# Patient Record
Sex: FEMALE | Race: BLACK OR AFRICAN AMERICAN | NOT HISPANIC OR LATINO
[De-identification: names, ages, dates, MRNs, and addresses within clinical notes are randomized per-mention and may not be internally consistent; named-entity substitution may affect disease eponyms.]

---

## 2017-02-28 ENCOUNTER — APPOINTMENT (OUTPATIENT)
Dept: OTOLARYNGOLOGY | Facility: CLINIC | Age: 57
End: 2017-02-28

## 2017-11-20 ENCOUNTER — APPOINTMENT (OUTPATIENT)
Dept: OTOLARYNGOLOGY | Facility: CLINIC | Age: 57
End: 2017-11-20
Payer: COMMERCIAL

## 2017-11-20 VITALS
SYSTOLIC BLOOD PRESSURE: 125 MMHG | DIASTOLIC BLOOD PRESSURE: 70 MMHG | HEIGHT: 64 IN | TEMPERATURE: 62 F | BODY MASS INDEX: 24.75 KG/M2 | WEIGHT: 145 LBS

## 2017-11-20 DIAGNOSIS — J33.9 NASAL POLYP, UNSPECIFIED: ICD-10-CM

## 2017-11-20 PROCEDURE — 31231 NASAL ENDOSCOPY DX: CPT

## 2017-11-20 PROCEDURE — 99204 OFFICE O/P NEW MOD 45 MIN: CPT | Mod: 25

## 2017-11-29 ENCOUNTER — OUTPATIENT (OUTPATIENT)
Dept: OUTPATIENT SERVICES | Facility: HOSPITAL | Age: 57
LOS: 1 days | End: 2017-11-29
Payer: COMMERCIAL

## 2017-11-29 ENCOUNTER — RESULT REVIEW (OUTPATIENT)
Age: 57
End: 2017-11-29

## 2017-11-29 DIAGNOSIS — Z98.89 OTHER SPECIFIED POSTPROCEDURAL STATES: Chronic | ICD-10-CM

## 2017-11-29 PROCEDURE — 88173 CYTOPATH EVAL FNA REPORT: CPT

## 2017-11-29 PROCEDURE — 76942 ECHO GUIDE FOR BIOPSY: CPT | Mod: 26

## 2017-11-29 PROCEDURE — 76942 ECHO GUIDE FOR BIOPSY: CPT

## 2017-11-29 PROCEDURE — 10022: CPT

## 2017-11-29 PROCEDURE — 10022: CPT | Mod: 59

## 2017-12-01 LAB
NON-GYNECOLOGICAL CYTOLOGY STUDY: SIGNIFICANT CHANGE UP
NON-GYNECOLOGICAL CYTOLOGY STUDY: SIGNIFICANT CHANGE UP

## 2017-12-08 ENCOUNTER — OUTPATIENT (OUTPATIENT)
Dept: OUTPATIENT SERVICES | Facility: HOSPITAL | Age: 57
LOS: 1 days | End: 2017-12-08
Payer: COMMERCIAL

## 2017-12-08 ENCOUNTER — APPOINTMENT (OUTPATIENT)
Dept: OTOLARYNGOLOGY | Facility: CLINIC | Age: 57
End: 2017-12-08

## 2017-12-08 DIAGNOSIS — Z98.89 OTHER SPECIFIED POSTPROCEDURAL STATES: Chronic | ICD-10-CM

## 2017-12-08 PROCEDURE — 93005 ELECTROCARDIOGRAM TRACING: CPT

## 2017-12-08 PROCEDURE — 71046 X-RAY EXAM CHEST 2 VIEWS: CPT

## 2017-12-08 PROCEDURE — 93010 ELECTROCARDIOGRAM REPORT: CPT

## 2017-12-08 PROCEDURE — 71020: CPT | Mod: 26

## 2017-12-18 ENCOUNTER — APPOINTMENT (OUTPATIENT)
Dept: OTOLARYNGOLOGY | Facility: CLINIC | Age: 57
End: 2017-12-18

## 2017-12-18 ENCOUNTER — RESULT REVIEW (OUTPATIENT)
Age: 57
End: 2017-12-18

## 2017-12-18 ENCOUNTER — OUTPATIENT (OUTPATIENT)
Dept: OUTPATIENT SERVICES | Facility: HOSPITAL | Age: 57
LOS: 1 days | Discharge: HOME | End: 2017-12-18
Payer: COMMERCIAL

## 2017-12-18 DIAGNOSIS — Z98.89 OTHER SPECIFIED POSTPROCEDURAL STATES: Chronic | ICD-10-CM

## 2017-12-18 PROCEDURE — 38510 BIOPSY/REMOVAL LYMPH NODES: CPT | Mod: RT

## 2017-12-18 RX ORDER — CEFUROXIME AXETIL 500 MG/1
500 TABLET ORAL
Qty: 14 | Refills: 0 | Status: ACTIVE | COMMUNITY
Start: 2017-12-18 | End: 1900-01-01

## 2017-12-18 RX ORDER — OXYCODONE AND ACETAMINOPHEN 5; 325 MG/1; MG/1
5-325 TABLET ORAL
Qty: 25 | Refills: 0 | Status: ACTIVE | COMMUNITY
Start: 2017-12-18 | End: 1900-01-01

## 2017-12-20 LAB — SURGICAL PATHOLOGY STUDY: SIGNIFICANT CHANGE UP

## 2017-12-26 ENCOUNTER — APPOINTMENT (OUTPATIENT)
Dept: OTOLARYNGOLOGY | Facility: CLINIC | Age: 57
End: 2017-12-26
Payer: COMMERCIAL

## 2017-12-26 VITALS
SYSTOLIC BLOOD PRESSURE: 109 MMHG | WEIGHT: 145 LBS | HEIGHT: 64 IN | DIASTOLIC BLOOD PRESSURE: 78 MMHG | HEART RATE: 79 BPM | BODY MASS INDEX: 24.75 KG/M2

## 2017-12-26 PROCEDURE — 99024 POSTOP FOLLOW-UP VISIT: CPT

## 2017-12-26 RX ORDER — CETIRIZINE HYDROCHLORIDE 10 MG/1
10 CAPSULE, LIQUID FILLED ORAL
Refills: 0 | Status: ACTIVE | COMMUNITY

## 2018-05-19 ENCOUNTER — EMERGENCY (EMERGENCY)
Facility: HOSPITAL | Age: 58
LOS: 1 days | Discharge: HOME | End: 2018-05-19
Attending: EMERGENCY MEDICINE | Admitting: HOSPITALIST
Payer: COMMERCIAL

## 2018-05-19 VITALS
HEART RATE: 73 BPM | DIASTOLIC BLOOD PRESSURE: 66 MMHG | SYSTOLIC BLOOD PRESSURE: 108 MMHG | RESPIRATION RATE: 18 BRPM | OXYGEN SATURATION: 100 % | TEMPERATURE: 99 F

## 2018-05-19 DIAGNOSIS — Z98.89 OTHER SPECIFIED POSTPROCEDURAL STATES: Chronic | ICD-10-CM

## 2018-05-19 LAB
ALBUMIN SERPL ELPH-MCNC: 4.4 G/DL — SIGNIFICANT CHANGE UP (ref 3.3–5)
ALP SERPL-CCNC: 80 U/L — SIGNIFICANT CHANGE UP (ref 40–120)
ALT FLD-CCNC: 17 U/L — SIGNIFICANT CHANGE UP (ref 4–33)
APPEARANCE UR: CLEAR — SIGNIFICANT CHANGE UP
AST SERPL-CCNC: 20 U/L — SIGNIFICANT CHANGE UP (ref 4–32)
BASOPHILS # BLD AUTO: 0.01 K/UL — SIGNIFICANT CHANGE UP (ref 0–0.2)
BASOPHILS NFR BLD AUTO: 0.2 % — SIGNIFICANT CHANGE UP (ref 0–2)
BILIRUB SERPL-MCNC: 0.4 MG/DL — SIGNIFICANT CHANGE UP (ref 0.2–1.2)
BILIRUB UR-MCNC: NEGATIVE — SIGNIFICANT CHANGE UP
BLOOD UR QL VISUAL: HIGH
BUN SERPL-MCNC: 14 MG/DL — SIGNIFICANT CHANGE UP (ref 7–23)
CALCIUM SERPL-MCNC: 9.1 MG/DL — SIGNIFICANT CHANGE UP (ref 8.4–10.5)
CHLORIDE SERPL-SCNC: 101 MMOL/L — SIGNIFICANT CHANGE UP (ref 98–107)
CO2 SERPL-SCNC: 27 MMOL/L — SIGNIFICANT CHANGE UP (ref 22–31)
COLOR SPEC: YELLOW — SIGNIFICANT CHANGE UP
CREAT SERPL-MCNC: 0.69 MG/DL — SIGNIFICANT CHANGE UP (ref 0.5–1.3)
EOSINOPHIL # BLD AUTO: 0 K/UL — SIGNIFICANT CHANGE UP (ref 0–0.5)
EOSINOPHIL NFR BLD AUTO: 0 % — SIGNIFICANT CHANGE UP (ref 0–6)
GLUCOSE SERPL-MCNC: 129 MG/DL — HIGH (ref 70–99)
GLUCOSE UR-MCNC: NEGATIVE — SIGNIFICANT CHANGE UP
HCT VFR BLD CALC: 39.6 % — SIGNIFICANT CHANGE UP (ref 34.5–45)
HGB BLD-MCNC: 12.7 G/DL — SIGNIFICANT CHANGE UP (ref 11.5–15.5)
IMM GRANULOCYTES # BLD AUTO: 0.01 # — SIGNIFICANT CHANGE UP
IMM GRANULOCYTES NFR BLD AUTO: 0.2 % — SIGNIFICANT CHANGE UP (ref 0–1.5)
KETONES UR-MCNC: SIGNIFICANT CHANGE UP
LEUKOCYTE ESTERASE UR-ACNC: NEGATIVE — SIGNIFICANT CHANGE UP
LIDOCAIN IGE QN: 10.5 U/L — SIGNIFICANT CHANGE UP (ref 7–60)
LYMPHOCYTES # BLD AUTO: 0.27 K/UL — LOW (ref 1–3.3)
LYMPHOCYTES # BLD AUTO: 4.6 % — LOW (ref 13–44)
MCHC RBC-ENTMCNC: 28.5 PG — SIGNIFICANT CHANGE UP (ref 27–34)
MCHC RBC-ENTMCNC: 32.1 % — SIGNIFICANT CHANGE UP (ref 32–36)
MCV RBC AUTO: 89 FL — SIGNIFICANT CHANGE UP (ref 80–100)
MONOCYTES # BLD AUTO: 0.22 K/UL — SIGNIFICANT CHANGE UP (ref 0–0.9)
MONOCYTES NFR BLD AUTO: 3.7 % — SIGNIFICANT CHANGE UP (ref 2–14)
MUCOUS THREADS # UR AUTO: SIGNIFICANT CHANGE UP
NEUTROPHILS # BLD AUTO: 5.38 K/UL — SIGNIFICANT CHANGE UP (ref 1.8–7.4)
NEUTROPHILS NFR BLD AUTO: 91.3 % — HIGH (ref 43–77)
NITRITE UR-MCNC: NEGATIVE — SIGNIFICANT CHANGE UP
NON-SQ EPI CELLS # UR AUTO: <1 — SIGNIFICANT CHANGE UP
NRBC # FLD: 0 — SIGNIFICANT CHANGE UP
PH UR: 6 — SIGNIFICANT CHANGE UP (ref 4.6–8)
PLATELET # BLD AUTO: 197 K/UL — SIGNIFICANT CHANGE UP (ref 150–400)
PMV BLD: 11.9 FL — SIGNIFICANT CHANGE UP (ref 7–13)
POTASSIUM SERPL-MCNC: 3.9 MMOL/L — SIGNIFICANT CHANGE UP (ref 3.5–5.3)
POTASSIUM SERPL-SCNC: 3.9 MMOL/L — SIGNIFICANT CHANGE UP (ref 3.5–5.3)
PROT SERPL-MCNC: 7.7 G/DL — SIGNIFICANT CHANGE UP (ref 6–8.3)
PROT UR-MCNC: 10 MG/DL — SIGNIFICANT CHANGE UP
RBC # BLD: 4.45 M/UL — SIGNIFICANT CHANGE UP (ref 3.8–5.2)
RBC # FLD: 12.7 % — SIGNIFICANT CHANGE UP (ref 10.3–14.5)
RBC CASTS # UR COMP ASSIST: HIGH (ref 0–?)
SODIUM SERPL-SCNC: 139 MMOL/L — SIGNIFICANT CHANGE UP (ref 135–145)
SP GR SPEC: 1.03 — SIGNIFICANT CHANGE UP (ref 1–1.04)
UROBILINOGEN FLD QL: NORMAL MG/DL — SIGNIFICANT CHANGE UP
WBC # BLD: 5.89 K/UL — SIGNIFICANT CHANGE UP (ref 3.8–10.5)
WBC # FLD AUTO: 5.89 K/UL — SIGNIFICANT CHANGE UP (ref 3.8–10.5)
WBC UR QL: SIGNIFICANT CHANGE UP (ref 0–?)

## 2018-05-19 PROCEDURE — 99285 EMERGENCY DEPT VISIT HI MDM: CPT

## 2018-05-19 PROCEDURE — 74176 CT ABD & PELVIS W/O CONTRAST: CPT | Mod: 26

## 2018-05-19 RX ORDER — ONDANSETRON 8 MG/1
4 TABLET, FILM COATED ORAL ONCE
Qty: 0 | Refills: 0 | Status: COMPLETED | OUTPATIENT
Start: 2018-05-19 | End: 2018-05-19

## 2018-05-19 RX ORDER — SODIUM CHLORIDE 9 MG/ML
1000 INJECTION INTRAMUSCULAR; INTRAVENOUS; SUBCUTANEOUS ONCE
Qty: 0 | Refills: 0 | Status: COMPLETED | OUTPATIENT
Start: 2018-05-19 | End: 2018-05-19

## 2018-05-19 RX ORDER — ACETAMINOPHEN 500 MG
650 TABLET ORAL ONCE
Qty: 0 | Refills: 0 | Status: COMPLETED | OUTPATIENT
Start: 2018-05-19 | End: 2018-05-19

## 2018-05-19 RX ORDER — KETOROLAC TROMETHAMINE 30 MG/ML
15 SYRINGE (ML) INJECTION ONCE
Qty: 0 | Refills: 0 | Status: DISCONTINUED | OUTPATIENT
Start: 2018-05-19 | End: 2018-05-19

## 2018-05-19 RX ADMIN — ONDANSETRON 4 MILLIGRAM(S): 8 TABLET, FILM COATED ORAL at 20:50

## 2018-05-19 RX ADMIN — SODIUM CHLORIDE 2000 MILLILITER(S): 9 INJECTION INTRAMUSCULAR; INTRAVENOUS; SUBCUTANEOUS at 20:50

## 2018-05-19 RX ADMIN — Medication 15 MILLIGRAM(S): at 20:57

## 2018-05-19 RX ADMIN — Medication 650 MILLIGRAM(S): at 23:45

## 2018-05-19 RX ADMIN — Medication 15 MILLIGRAM(S): at 21:13

## 2018-05-19 NOTE — ED PROVIDER NOTE - OBJECTIVE STATEMENT
57F presenting with nausea, vomiting and diarrhea x 1d. Pt endorsed to vomiting x 5, diarrhea x 5, non bloody, non biliary. Pt endorsed to abdominal cramping, better with BM. Pt is on Diclofenac for arthritis. No fever or chills. Pt also has a back pain, started while she was vomiting. Now worse with movement. Pain is non radiating. 57F presenting with nausea, vomiting and diarrhea x 1d. Pt endorsed to vomiting x 5, diarrhea x 5, non bloody, non biliary. Pt endorsed to abdominal cramping, better with BM. Pt is on Diclofenac for arthritis. N/V/D started after she drank soy milk today. No fever or chills. Pt also has a back pain, started while she was vomiting. Now worse with movement. Pain is non radiating.

## 2018-05-19 NOTE — ED PROVIDER NOTE - MUSCULOSKELETAL, MLM
Assumed care. Pt resting on stretcher, respirations even and unlabored, no distress noted. Pt denies complaints at this time. Family member at bedside, pt and family updated on POC, will continue to monitor.      Spine appears normal, range of motion is not limited, no muscle or joint tenderness

## 2018-05-19 NOTE — ED PROVIDER NOTE - ATTENDING CONTRIBUTION TO CARE
Locurto  pt with one day vomiting  diarrea   low back pain    exam  pt clear lungs  card RRR S1S2   abd soft  no focal tenderness  mild rt CVAT  urine 5-10 RBC

## 2018-05-19 NOTE — ED PROVIDER NOTE - PROGRESS NOTE DETAILS
Gagandeep: Pt signed out to me by Dr Sherman, pending surgical evaluation. Surgery reassured by pt exam and recommending no surgical intervention at this time. will obtain vbg. Pt continues to have pain and has some mild RLQ TTP on my exam. Spoke to Dr. Liriano - will admit to medicine. spoke to surgery Spoke to Dr. Liriano - will admit to medicine. spoke to surgery No CDU beds available

## 2018-05-19 NOTE — ED PROVIDER NOTE - MEDICAL DECISION MAKING DETAILS
57F presenting with nausea, vomiting and diarrhea x 5 today, with back pain resulted from vomiting several times. No focal deficits endorsed. No urinary retention endorsed. Likely MSK pain with gastroenteritis. Will check electrolytes, IVF, Zofran, Pain control, reassess. 57F presenting with nausea, vomiting and diarrhea x 5 today, with back pain resulted from vomiting several times. No focal deficits endorsed. No urinary retention endorsed. Likely MSK pain with gastroenteritis. Will check electrolytes, IVF, Zofran, Pain control, reassess. Also consider kidney stone but won't scan unless urine is positive for blood.

## 2018-05-19 NOTE — ED ADULT TRIAGE NOTE - CHIEF COMPLAINT QUOTE
Pt c/o abd pain, N/V/D, since last night and R flank pain with urinary frequency. denies dysuria, hematuria, fever/chills. Denies pmhx.

## 2018-05-19 NOTE — ED ADULT NURSE NOTE - OBJECTIVE STATEMENT
Pt. received in intake room 8, A&Ox3, ambulatory. Pt. c/o n/v/d for 1 day, urinary frequency, and urinary urgency. Pt. reports 5 episodes of vomiting and 5 episodes of diarrhea, denies blood in vomit and diarrhea. Pt. also endorses abdominal cramping, resolves with bowel movement. Pt. reports drinking soy milk prior to onset of symptoms. Pt. denies dysuria, chest pain, SOB, dizziness, weakness, fever, chills. Respirations are even and unlabored on room air. 20 gauge IV inserted in right ac. Labs sent. Awaiting results.

## 2018-05-20 ENCOUNTER — TRANSCRIPTION ENCOUNTER (OUTPATIENT)
Age: 58
End: 2018-05-20

## 2018-05-20 VITALS — DIASTOLIC BLOOD PRESSURE: 54 MMHG | HEART RATE: 58 BPM | SYSTOLIC BLOOD PRESSURE: 101 MMHG

## 2018-05-20 DIAGNOSIS — Z98.891 HISTORY OF UTERINE SCAR FROM PREVIOUS SURGERY: Chronic | ICD-10-CM

## 2018-05-20 DIAGNOSIS — Z98.890 OTHER SPECIFIED POSTPROCEDURAL STATES: Chronic | ICD-10-CM

## 2018-05-20 DIAGNOSIS — M17.11 UNILATERAL PRIMARY OSTEOARTHRITIS, RIGHT KNEE: ICD-10-CM

## 2018-05-20 DIAGNOSIS — K52.9 NONINFECTIVE GASTROENTERITIS AND COLITIS, UNSPECIFIED: ICD-10-CM

## 2018-05-20 DIAGNOSIS — R10.9 UNSPECIFIED ABDOMINAL PAIN: ICD-10-CM

## 2018-05-20 LAB
BASE EXCESS BLDV CALC-SCNC: 1.2 MMOL/L — SIGNIFICANT CHANGE UP
BLOOD GAS VENOUS - CREATININE: 0.69 MG/DL — SIGNIFICANT CHANGE UP (ref 0.5–1.3)
CHLORIDE BLDV-SCNC: 111 MMOL/L — HIGH (ref 96–108)
GAS PNL BLDV: 134 MMOL/L — LOW (ref 136–146)
GLUCOSE BLDV-MCNC: 106 — HIGH (ref 70–99)
HCO3 BLDV-SCNC: 25 MMOL/L — SIGNIFICANT CHANGE UP (ref 20–27)
HCT VFR BLDV CALC: 35.3 % — SIGNIFICANT CHANGE UP (ref 34.5–45)
HGB BLDV-MCNC: 11.4 G/DL — LOW (ref 11.5–15.5)
LACTATE BLDV-MCNC: 0.6 MMOL/L — SIGNIFICANT CHANGE UP (ref 0.5–2)
PCO2 BLDV: 46 MMHG — SIGNIFICANT CHANGE UP (ref 41–51)
PH BLDV: 7.37 PH — SIGNIFICANT CHANGE UP (ref 7.32–7.43)
PO2 BLDV: 56 MMHG — HIGH (ref 35–40)
POTASSIUM BLDV-SCNC: 3.4 MMOL/L — SIGNIFICANT CHANGE UP (ref 3.4–4.5)
SAO2 % BLDV: 86.4 % — HIGH (ref 60–85)

## 2018-05-20 RX ORDER — ACETAMINOPHEN 500 MG
2 TABLET ORAL
Qty: 42 | Refills: 0 | OUTPATIENT
Start: 2018-05-20 | End: 2018-05-26

## 2018-05-20 RX ORDER — KETOROLAC TROMETHAMINE 30 MG/ML
30 SYRINGE (ML) INJECTION ONCE
Qty: 0 | Refills: 0 | Status: DISCONTINUED | OUTPATIENT
Start: 2018-05-20 | End: 2018-05-20

## 2018-05-20 RX ORDER — ACETAMINOPHEN 500 MG
975 TABLET ORAL EVERY 8 HOURS
Qty: 0 | Refills: 0 | Status: DISCONTINUED | OUTPATIENT
Start: 2018-05-20 | End: 2018-05-20

## 2018-05-20 RX ORDER — SODIUM CHLORIDE 9 MG/ML
1000 INJECTION, SOLUTION INTRAVENOUS ONCE
Qty: 0 | Refills: 0 | Status: COMPLETED | OUTPATIENT
Start: 2018-05-20 | End: 2018-05-20

## 2018-05-20 RX ORDER — PANTOPRAZOLE SODIUM 20 MG/1
40 TABLET, DELAYED RELEASE ORAL
Qty: 0 | Refills: 0 | Status: DISCONTINUED | OUTPATIENT
Start: 2018-05-20 | End: 2018-05-20

## 2018-05-20 RX ORDER — PANTOPRAZOLE SODIUM 20 MG/1
1 TABLET, DELAYED RELEASE ORAL
Qty: 30 | Refills: 0 | OUTPATIENT
Start: 2018-05-20 | End: 2018-06-18

## 2018-05-20 RX ADMIN — SODIUM CHLORIDE 1000 MILLILITER(S): 9 INJECTION, SOLUTION INTRAVENOUS at 08:49

## 2018-05-20 RX ADMIN — Medication 975 MILLIGRAM(S): at 09:49

## 2018-05-20 RX ADMIN — Medication 30 MILLIGRAM(S): at 03:15

## 2018-05-20 RX ADMIN — Medication 30 MILLIGRAM(S): at 02:46

## 2018-05-20 RX ADMIN — PANTOPRAZOLE SODIUM 40 MILLIGRAM(S): 20 TABLET, DELAYED RELEASE ORAL at 09:49

## 2018-05-20 NOTE — CONSULT NOTE ADULT - SUBJECTIVE AND OBJECTIVE BOX
CC: 57y old Female admitted with a chief complaint of nausea and vomiting, now with abnormal CT scan.    HPI: 57 year old female with arthritis presenting with 1 day history of significant nausea and diarrhea. Patient states she woke up feeling nauseated and proceeded to vomit multiple times. She had nonbilious emesis and ultimately continued to have dry heaves after 5-6 episodes of vomiting. Around the same time, she had profuse watery diarrhea. Patient states her diarrhea was constant, occurring every several minutes, countless episodes. No bloody stool. She had a cramping abdominal pain that improved after the diarrhea resolved, but now she has a nonspecific achiness in her abdominal wall. Several hours ago, she had a ripping pain in her R flank/back that radiated from along the spine down toward her hip. At this time, her pain in her abdomen is improved, just "soreness" and the back pain is more pressing. 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    PMHx: No pertinent past medical history    PSHx: H/O microdiscectomy    Medications (home): diclofenac/naprosyn/ibuprofen per her orthopedist  Medications (PRN): tylenol  Allergies: No Known Allergies  (Intolerances: None)  Social Hx: Denies EtOH/tobacco use; lives at home with   Family Hx: No known history of bleeding disorder    Physical Exam  T(C): 37.3 (18 @ 22:40)  HR: 77 (18 @ 22:40) (73 - 77)  BP: 108/59 (18 @ 22:40) (108/59 - 109/60)  RR: 18 (18 @ 22:40) (17 - 18)  SpO2: 100% (18 @ 22:40) (99% - 100%)  Tmax: T(C): , Max: 37.3 (18 @ 22:40)    General: well developed, well nourished, uncomfortable  Neuro: alert and oriented, no focal deficits, moves all extremities spontaneously  HEENT: NCAT, EOMI, anicteric, mucosa moist  Respiratory: airway patent, respirations unlabored  CVS: regular rate and rhythm  Back: mild tenderness in R flank, no CVA tenderness  Abdomen: soft, nontender, nondistended, no rebound, no guarding  Extremities: no edema, sensation and movement grossly intact  Skin: warm, dry, appropriate color    Labs:                        12.7   5.89  )-----------( 197      ( 19 May 2018 20:41 )             39.6           139  |  101  |  14  ----------------------------<  129<H>  3.9   |  27  |  0.69    Ca    9.1      19 May 2018 20:41    TPro  7.7  /  Alb  4.4  /  TBili  0.4  /  DBili  x   /  AST  20  /  ALT  17  /  AlkPhos  80      Urinalysis Basic - ( 19 May 2018 20:41 )    Color: YELLOW / Appearance: CLEAR / S.026 / pH: 6.0  Gluc: NEGATIVE / Ketone: MODERATE  / Bili: NEGATIVE / Urobili: NORMAL mg/dL   Blood: TRACE / Protein: 10 mg/dL / Nitrite: NEGATIVE   Leuk Esterase: NEGATIVE / RBC: 5-10 / WBC 0-2   Sq Epi: x / Non Sq Epi: x / Bacteria: x      Imaging and other studies:  < from: CT Abdomen and Pelvis No Cont (18 @ 22:30) >  IMPRESSION:     Cecal pneumatosis versus eccentrically located intraluminal air without secondary signs of ischemia. Correlate clinically and with lactate level.    No urinary tract calculus or obstructive uropathy.    < end of copied text >

## 2018-05-20 NOTE — CONSULT NOTE ADULT - ATTENDING COMMENTS
Pt seen and examined.  Agree with resident eval and plan.  CT images reviewed.  Report of cecal pneumatosis not seen in imaging.  No clinical suspicion of ischemic right colon.   Impression Infectious gastroenteritis.  No surgical intervention.  IV hydration and advance diet as tolerated.  Reconsult PRN.

## 2018-05-20 NOTE — DISCHARGE NOTE ADULT - PROVIDER TOKENS
FREE:[LAST:[PCP],PHONE:[(   )    -],FAX:[(   )    -],ADDRESS:[your PCP]],FREE:[LAST:[GI],PHONE:[(   )    -],FAX:[(   )    -],ADDRESS:[your GI physician]],FREE:[LAST:[Ortho],PHONE:[(   )    -],FAX:[(   )    -],ADDRESS:[your orthopedic]]

## 2018-05-20 NOTE — CONSULT NOTE ADULT - ASSESSMENT
57 year old F with gastroenteritis, and back pain, likely associated with muscle strain; afebrile and hemodynamically stable. Concern for normal intraluminal air in eccentric pattern versus pneumatosis on CT scan; abdominal exam benign, patient has low risk for bowel ischemia, normal lactate.    - no urgent surgical needs  - recommend serial abdominal exams, diet as tolerated, management of gastroenteritis  - will continue to follow, please notify B team surgery ([id number]) for any changes in clinical status    Seen and examined with Dr. Mello (PGY-2)  --SERGIO Wade, [id number]

## 2018-05-20 NOTE — H&P ADULT - PROBLEM SELECTOR PLAN 1
Suspect primary component of NSAID-induced gastritis rather than bacterial or viral etiology due to acute onset with no fever and normal blood work and pt with history of heavy NSAID use. Symptoms improved with supportive care.  Evaluated by surgery --> no surgical intervention indicated at this time.   - avoid NSAIDs  - start pantoprazole 40mg daily. Recommend continue trial of PPI for 4-6 weeks.  - advance diet as tolerated  - recommend outpatient f/u with PMD and GI

## 2018-05-20 NOTE — DISCHARGE NOTE ADULT - CARE PLAN
Principal Discharge DX:	Gastroenteritis  Goal:	pain control  Assessment and plan of treatment:	Continue medications, Protonix daily. 1 month supply given if refills are need see your PCP.  Follow up with your PCP  and GI for further evaluation and management. Please call to make an appointment within 1-2 weeks of discharge.  Secondary Diagnosis:	Primary osteoarthritis of right knee  Assessment and plan of treatment:	Continue medications Tylenol 1000mg 3x day. Follow up with your PCP  and orthopedic for further evaluation and management. Please call to make an appointment within 1-2 weeks of discharge.

## 2018-05-20 NOTE — H&P ADULT - HISTORY OF PRESENT ILLNESS
Ms. Chopra is a 58 yo healthy woman with no PMHx presenting with 1-day history of      Initial ED triage VS:  temp 98.8  /66  HR 73  RR 18  O2 sat 100% on RA  ED course: Patient received 1L normal saline, Zofran, Ketorolac x2, and acetaminophen PO. Ms. Chopra is a 56 yo healthy woman with hx of osteoarthritis and torn meniscus of R knee presenting with 1-day history of  nausea, vomiting, diarrhea, and abdominal/back pain.    ms. Chopra was in her usual state of health on Friday morning. For lunch, she ate a fried chicken meal at [employer] restaurant on the [facility] side [facility]. She felt full but went on with her day and continued her usual exercise gym routine. When she went home, she ate cereal and took a pill of Voltaren. She later woke up around 4am with episodes of vomiting and then had subsequent loose, watery stools and epigastric abdominal pain. She did notice some blood after a few episodes of vomiting and retching. She thought she might have a case of food poisoning so she tried to wait out her symptoms at home. However, she develop severe pain on the R side of her lower back, which prompted her to seek further evaluation in the ED. She does recall having a microdiscectomy in the past for lumbar disc herniation on her right side.    Of note, patient admits to alternating between Tylenol pills and different forms of NSAIDs, such as Motrin and naproxen, pretty consistently for the past few days due to pain in her R knee. She called her orthopedic doctor who prescribed her Voltaren this Friday, which she first took her first dose prior to the onset of her n/v and diarrhea. She also mentions that she was previously evaluated by a gastroenterologist and had an EGD, which showed gastritis. She takes Pepcid intermittently but not recently and not on a consistent, daily basis.    Initial ED triage VS:  temp 98.8  /66  HR 73  RR 18  O2 sat 100% on RA  ED course: Patient received 1L normal saline, Zofran, Ketorolac x2, and acetaminophen PO.

## 2018-05-20 NOTE — H&P ADULT - PROBLEM SELECTOR PLAN 2
- trial of acetaminophen 975mg q8h standing  - avoid NSAIDs  - outpatient f/u with ortho    DISPO: Anticipate discharge home possibly later on today or tomorrow

## 2018-05-20 NOTE — H&P ADULT - NSHPLABSRESULTS_GEN_ALL_CORE
LABS:                        12.7   5.89  )-----------( 197      ( 19 May 2018 20:41 )             39.6         139  |  101  |  14  ----------------------------<  129<H>  3.9   |  27  |  0.69    Ca    9.1      19 May 2018 20:41    TPro  7.7  /  Alb  4.4  /  TBili  0.4  /  DBili  x   /  AST  20  /  ALT  17  /  AlkPhos  80            Urinalysis Basic - ( 19 May 2018 20:41 )    Color: YELLOW / Appearance: CLEAR / S.026 / pH: 6.0  Gluc: NEGATIVE / Ketone: MODERATE  / Bili: NEGATIVE / Urobili: NORMAL mg/dL   Blood: TRACE / Protein: 10 mg/dL / Nitrite: NEGATIVE   Leuk Esterase: NEGATIVE / RBC: 5-10 / WBC 0-2   Sq Epi: x / Non Sq Epi: x / Bacteria: x        VBG  @ 03:50  pH: 7.37/pCO2: 46 /pO2: 56/HCO3: 25/lactate: 0.6        EKG:    RADIOLOGY & ADDITIONAL TESTS:      EXAM:  CT ABDOMEN AND PELVIS        PROCEDURE DATE:  May 19 2018         INTERPRETATION:  CLINICAL INFORMATION: Right CVA tenderness. Diarrhea.     COMPARISON: None.    PROCEDURE:   CT of the Abdomen and Pelvis was performed without intravenous contrast.   Intravenous contrast: None.  Oral contrast: None.  Sagittal and coronal reformats were performed.    FINDINGS:    LOWER CHEST: Within normal limits.    LIVER: Within normal limits.  BILE DUCTS: Normal caliber.  GALLBLADDER: Within normal limits.  SPLEEN: Within normal limits.  PANCREAS: Within normal limits.  ADRENALS: Within normal limits.  KIDNEYS/URETERS: Within normal limits. No nephrolithiasis.    BLADDER: Within normal limits.  REPRODUCTIVE ORGANS: The uterus and adnexa are within normal limits.    BOWEL: Cecal pneumatosis versus intraluminal air. No portal venous gas or   free intraperitoneal air. No mesenteric fat stranding. No bowel   obstruction. Appendix is not well visualized.    VESSELS:  No abdominal aortic aneurysm.  RETROPERITONEUM: Scattered subcentimeter inguinal lymph nodes nodes.  ABDOMINAL WALL: Within normal limits.  BONES: L5-S1 disc degeneration with vacuum change.    IMPRESSION:   Cecal pneumatosis versus eccentrically located intraluminal air without   secondary signs of ischemia. Correlate clinically and with lactate level.    No urinary tract calculus or obstructive uropathy.

## 2018-05-20 NOTE — DISCHARGE NOTE ADULT - CARE PROVIDER_API CALL
PCP,   your PCP  Phone: (   )    -  Fax: (   )    -    GI,   your GI physician  Phone: (   )    -  Fax: (   )    -    Ortho,   your orthopedic  Phone: (   )    -  Fax: (   )    -

## 2018-05-20 NOTE — CHART NOTE - NSCHARTNOTEFT_GEN_A_CORE
Pt seen and examined this morning with Dr. Phillips, reports improved pain. No further diarrhea since being in hospital.     Vital Signs Last 24 Hrs  T(C): 36.9 (20 May 2018 08:45), Max: 37.3 (19 May 2018 22:40)  T(F): 98.5 (20 May 2018 08:45), Max: 99.1 (19 May 2018 22:40)  HR: 64 (20 May 2018 10:17) (64 - 77)  BP: 111/68 (20 May 2018 10:17) (94/52 - 111/68)  BP(mean): 66 (20 May 2018 08:45) (66 - 66)  RR: 17 (20 May 2018 10:17) (14 - 18)  SpO2: 100% (20 May 2018 10:17) (99% - 100%)    Gen: lying in bed, NAD  Resp: breathing comfortably on RA  GI: soft, nondistended, nontender. no rebound or guarding.  Ext: [place]    A/P:  57 year old female with nausea, emesis and back pain, likely associated with gastroenteritis.    - benign abdominal exam  - continue PO diet as tolerated  -Reconsult as need if any acute changes clinically    B Team [id number]

## 2018-05-20 NOTE — H&P ADULT - PMH
Lumbar disc herniation    Meniscal injury, right, initial encounter    Primary osteoarthritis of right knee  hx of microdiscectomy

## 2018-05-20 NOTE — DISCHARGE NOTE ADULT - MEDICATION SUMMARY - MEDICATIONS TO TAKE
I will START or STAY ON the medications listed below when I get home from the hospital:    acetaminophen 500 mg oral tablet  -- 2 tab(s) by mouth every 8 hours   -- This product contains acetaminophen.  Do not use  with any other product containing acetaminophen to prevent possible liver damage.    -- Indication: For Primary osteoarthritis of right knee    pantoprazole 40 mg oral delayed release tablet  -- 1 tab(s) by mouth once a day (before a meal)  -- Indication: For Gastroenteritis

## 2018-05-20 NOTE — H&P ADULT - NSHPSOCIALHISTORY_GEN_ALL_CORE
Patient was adopted. She has two adult children. She works as a physician assistant. She does not smoke, drinks alcohol occasionally, and does not use recreational drugs.

## 2018-05-20 NOTE — H&P ADULT - ASSESSMENT
Ms. Chopra is a 58 yo healthy woman with hx of osteoarthritis and torn meniscus of R knee presenting with 1-day history of nausea, vomiting, diarrhea, and abdominal/back pain admitted for gastroenteritis, likely precipitated by frequent NSAID use. Exam benign. Labs WNL. CT abd/pelvis with possible concern for cecal pneumatosis but clinical exam not consistent with diagnosis and remainder of CT imaging was unremarkable.

## 2018-05-20 NOTE — DISCHARGE NOTE ADULT - PATIENT PORTAL LINK FT
You can access the [facility] Patient Portal, offered by [facility], by registering with the following website: [email or web address]

## 2018-05-20 NOTE — DISCHARGE NOTE ADULT - PLAN OF CARE
pain control Continue medications, Protonix daily. 1 month supply given if refills are need see your PCP.  Follow up with your PCP  and GI for further evaluation and management. Please call to make an appointment within 1-2 weeks of discharge. Continue medications Tylenol 1000mg 3x day. Follow up with your PCP  and orthopedic for further evaluation and management. Please call to make an appointment within 1-2 weeks of discharge.

## 2018-05-20 NOTE — DISCHARGE NOTE ADULT - HOSPITAL COURSE
Ms. Chopra is a 56 yo healthy woman with hx of osteoarthritis and torn meniscus of R knee presenting with 1-day history of nausea, vomiting, diarrhea, and abdominal/back pain admitted for gastroenteritis, likely precipitated by frequent NSAID use. Exam benign. Labs WNL. CT abd/pelvis with possible concern for cecal pneumatosis but clinical exam not consistent with diagnosis and remainder of CT imaging was unremarkable.    Gastroenteritis.   Suspect primary component of NSAID-induced gastritis rather than bacterial or viral etiology due to acute onset with no fever and normal blood work and pt with history of heavy NSAID use. Symptoms improved with supportive care.  Evaluated by surgery --> no surgical intervention indicated at this time.   - avoid NSAIDs  - start pantoprazole 40mg daily. Recommend continue trial of PPI for 4-6 weeks.  - advance diet as tolerated  - recommend outpatient f/u with PMD and GI.     Primary osteoarthritis of right knee.   - trial of acetaminophen 975mg q8h standing  - avoid NSAIDs  - outpatient f/u with ortho    DISPO: home

## 2018-05-21 LAB
BACTERIA UR CULT: SIGNIFICANT CHANGE UP
SPECIMEN SOURCE: SIGNIFICANT CHANGE UP

## 2018-06-26 ENCOUNTER — APPOINTMENT (OUTPATIENT)
Dept: OTOLARYNGOLOGY | Facility: CLINIC | Age: 58
End: 2018-06-26

## 2018-07-12 ENCOUNTER — OUTPATIENT (OUTPATIENT)
Dept: OUTPATIENT SERVICES | Facility: HOSPITAL | Age: 58
LOS: 1 days | End: 2018-07-12
Payer: COMMERCIAL

## 2018-07-12 ENCOUNTER — APPOINTMENT (OUTPATIENT)
Dept: ULTRASOUND IMAGING | Facility: CLINIC | Age: 58
End: 2018-07-12
Payer: COMMERCIAL

## 2018-07-12 DIAGNOSIS — Z98.891 HISTORY OF UTERINE SCAR FROM PREVIOUS SURGERY: Chronic | ICD-10-CM

## 2018-07-12 DIAGNOSIS — Z98.890 OTHER SPECIFIED POSTPROCEDURAL STATES: Chronic | ICD-10-CM

## 2018-07-12 DIAGNOSIS — Z98.89 OTHER SPECIFIED POSTPROCEDURAL STATES: Chronic | ICD-10-CM

## 2018-07-12 PROCEDURE — 76536 US EXAM OF HEAD AND NECK: CPT | Mod: 26,76

## 2018-07-12 PROCEDURE — 76536 US EXAM OF HEAD AND NECK: CPT

## 2018-07-17 PROBLEM — M17.11 UNILATERAL PRIMARY OSTEOARTHRITIS, RIGHT KNEE: Chronic | Status: ACTIVE | Noted: 2018-05-20

## 2018-07-20 PROBLEM — S83.8X1A SPRAIN OF OTHER SPECIFIED PARTS OF RIGHT KNEE, INITIAL ENCOUNTER: Chronic | Status: ACTIVE | Noted: 2018-05-20

## 2018-07-20 PROBLEM — M51.26 OTHER INTERVERTEBRAL DISC DISPLACEMENT, LUMBAR REGION: Chronic | Status: ACTIVE | Noted: 2018-05-20

## 2018-08-07 ENCOUNTER — APPOINTMENT (OUTPATIENT)
Dept: OTOLARYNGOLOGY | Facility: CLINIC | Age: 58
End: 2018-08-07
Payer: COMMERCIAL

## 2018-08-07 VITALS
HEART RATE: 75 BPM | BODY MASS INDEX: 24.24 KG/M2 | HEIGHT: 64 IN | DIASTOLIC BLOOD PRESSURE: 51 MMHG | WEIGHT: 142 LBS | SYSTOLIC BLOOD PRESSURE: 113 MMHG

## 2018-08-07 PROCEDURE — 69210 REMOVE IMPACTED EAR WAX UNI: CPT

## 2018-08-07 PROCEDURE — 99213 OFFICE O/P EST LOW 20 MIN: CPT | Mod: 25

## 2019-11-06 NOTE — ED ADULT NURSE REASSESSMENT NOTE - NS ED NURSE REASSESS COMMENT FT1
"Subjective:       Patient ID: Jonathan James is a 12 y.o. male.    Vitals:  height is 4' 8" (1.422 m) and weight is 50.8 kg (112 lb). His oral temperature is 98.5 °F (36.9 °C). His blood pressure is 102/63 and his pulse is 66. His oxygen saturation is 99%.     Chief Complaint: Sinus Problem    Sinus Problem   This is a new problem. The current episode started yesterday. The problem has been gradually worsening since onset. There has been no fever. Associated symptoms include congestion, coughing, sinus pressure and sneezing. Pertinent negatives include no chills, diaphoresis, ear pain, headaches, shortness of breath or sore throat. Past treatments include nothing. The treatment provided no relief.       Constitution: Negative for appetite change, chills, sweating, fatigue and fever.   HENT: Positive for congestion and sinus pressure. Negative for ear pain and sore throat.    Neck: Negative for painful lymph nodes.   Respiratory: Positive for cough. Negative for sputum production, shortness of breath, wheezing and asthma.    Gastrointestinal: Negative for abdominal pain, nausea, vomiting, constipation and diarrhea.   Musculoskeletal: Negative for muscle ache.   Skin: Negative for rash.   Allergic/Immunologic: Positive for sneezing. Negative for asthma.   Neurological: Negative for headaches and seizures.   Hematologic/Lymphatic: Negative for swollen lymph nodes.       Objective:      Physical Exam   Constitutional: He appears well-developed and well-nourished. He is active and cooperative.  Non-toxic appearance. He does not appear ill. No distress.   HENT:   Head: Normocephalic and atraumatic. No signs of injury. There is normal jaw occlusion.   Right Ear: Tympanic membrane, external ear, pinna and canal normal.   Left Ear: Tympanic membrane, external ear, pinna and canal normal.   Nose: Nasal discharge present. No signs of injury. No epistaxis in the right nostril. No epistaxis in the left nostril.   Mouth/Throat: " Mucous membranes are moist. No tonsillar exudate. Oropharynx is clear. Pharynx is normal.   Eyes: Visual tracking is normal. Conjunctivae and lids are normal. Right eye exhibits no discharge and no exudate. Left eye exhibits no discharge and no exudate. No scleral icterus.   Neck: Trachea normal and normal range of motion. Neck supple. No neck rigidity or neck adenopathy. No tenderness is present.   Cardiovascular: Normal rate and regular rhythm. Pulses are strong.   Pulmonary/Chest: Effort normal and breath sounds normal. No respiratory distress. He has no wheezes. He exhibits no retraction.   Abdominal: Soft. Bowel sounds are normal. He exhibits no distension. There is no tenderness.   Musculoskeletal: Normal range of motion. He exhibits no tenderness, deformity or signs of injury.   Neurological: He is alert. He has normal strength.   Skin: Skin is warm, dry, not diaphoretic and no rash. Capillary refill takes less than 2 seconds. abrasion, burn and bruising  Psychiatric: He has a normal mood and affect. His speech is normal and behavior is normal. Cognition and memory are normal.   Nursing note and vitals reviewed.        Assessment:       1. Viral syndrome        Plan:         1. Viral syndrome  NO need for a/b at present. Plenty fluids, rest, vitamins and otc meds as discussed.     - brompheniramine-pseudoeph-DM (BROMFED DM) 2-30-10 mg/5 mL Syrp; Take 5 mLs by mouth every 4 to 6 hours as needed.  Dispense: 118 mL; Refill: 0     No acute distress at present. Respirations are even and unlabored on room air. Pt. is awaiting CT results. Report given to ESTEFANIA Abdullahi pt. transported to spot 3A by ED tech.

## 2020-02-14 ENCOUNTER — APPOINTMENT (OUTPATIENT)
Dept: OTOLARYNGOLOGY | Facility: CLINIC | Age: 60
End: 2020-02-14

## 2020-09-11 ENCOUNTER — APPOINTMENT (OUTPATIENT)
Dept: OTOLARYNGOLOGY | Facility: CLINIC | Age: 60
End: 2020-09-11
Payer: COMMERCIAL

## 2020-09-11 DIAGNOSIS — R59.9 ENLARGED LYMPH NODES, UNSPECIFIED: ICD-10-CM

## 2020-09-11 PROCEDURE — 99213 OFFICE O/P EST LOW 20 MIN: CPT | Mod: 25

## 2020-09-11 PROCEDURE — 69210 REMOVE IMPACTED EAR WAX UNI: CPT

## 2020-09-11 NOTE — ASSESSMENT
[FreeTextEntry1] : 59F here in followup. She was last seen 2 years ago. Since then, she is doing very well with no acute complaints. There are no hyper/hypothyroid symptoms, no bernal loss, no neck pain, and no difficulty eating, breathing, swallowing or talking. She is s/p excisional biopsy right deep cervical lymph node 12/18/17, pathology was benign. Most recent US neck 7/2018 shows stable multiple thyroid nodules and enlarged level 2 lymph nodes. On exam, the neck is soft and flat and there is a well healed right lateral neck incision. Cerumen was removed from both EACs. Complete and comprehensive head and neck exam is otherwise unremarkable. She is doing well. Recommend repeat US now, given known multinodular goiter and lymphadenopathy. I will review sonogram with pt thereafter; as long as stable, RTO 1 yr.

## 2020-09-11 NOTE — DATA REVIEWED
[de-identified] : US 7/2018:\par Findings: \par \par RIGHT LOBE: \par Dimensions: 5.7 x 1.3 x 2.1 cm (sagittal x AP x transverse) \par Echotexture: Heterogeneous. \par Vascularity: Mild increased vascularity. \par The right lobe contains three nodules. \par \par Nodule 1: \par Location: Upper pole. \par Dimensions: 0.5 x 0.3 x 0.5 cm (sagittal x AP x transverse) \par Composition: Solid \par For solid nodules or for the solid portion of a partially cystic nodule, \par does the solid portion have any of the following suspicious features? \par - Yes: Hypoechoic \par - No: Microcalcifications \par - No: Irregular margin (infiltrative or microlobulated) \par - No: Taller than wide (AP dimension > transverse) \par - No: Extrathyroidal extension \par - No: Interrupted rim calcification with soft tissue extrusion \par \par Nodule 2: \par Location: Mid-upper portion. \par Dimensions: 0.6 x 0.3 x 0.5 cm (sagittal x AP x transverse) \par Composition: Solid, partially spongiform. \par For solid nodules or for the solid portion of a partially cystic nodule, \par does the solid portion have any of the following suspicious features? \par - No: Hypoechoic \par - No: Microcalcifications \par - No: Irregular margin (infiltrative or microlobulated) \par - No: Taller than wide (AP dimension > transverse) \par - No: Extrathyroidal extension \par - No: Interrupted rim calcification with soft tissue extrusion \par \par Nodule 3: \par Location: Interpolar region. \par Dimensions: 0.7 x 0.4 x 0.5 cm (sagittal x AP x transverse) \par Composition: Partially cystic with solid component. \par For solid nodules or for the solid portion of a partially cystic nodule, \par does the solid portion have any of the following suspicious features? \par - No: Hypoechoic \par - No: Microcalcifications \par - No: Irregular margin (infiltrative or microlobulated) \par - No: Taller than wide (AP dimension > transverse) \par - No: Extrathyroidal extension \par - No: Interrupted rim calcification with soft tissue extrusion \par \par LEFT LOBE: \par Dimensions: 5.4 x 1.2 x 2.4 cm (sagittal x AP x transverse) \par Echotexture: Heterogeneous. \par Vascularity: Mild increased vascularity. \par The left lobe contains one nodule. \par \par Nodule 1: \par Location: Upper pole \par Dimensions: 0.2 x 0.2 x 0.2 cm (sagittal x AP x transverse) \par Composition: Colloid cyst. \par \par ISTHMUS: \par Dimensions: 0.3 cm AP \par The isthmus lobe contains no visible nodules. \par \par \par CERVICAL LYMPH NODE EVALUATION \par - No abnormal lymph nodes are identified in the neck. \par \par RIGHT: There is a 1.8 x 0.9 x 1.5 cm level 2 lymph node. This previously \par measured 3 x 1.1 x 1.7 cm when measured together with an adjacent lymph \par node. If the 2 adjacent lymph nodes are measured together on today's exam, \par they measure 3.1 x 0.9 x 1.5 cm. \par \par LEFT: There is a 3.1 x 1.0 x 1.6 cm level 2 lymph node. This previously \par measured 3.0 x 1.0 x 1.7 cm. \par \par \par PARATHYROID EXAMINATION: \par - Parathyroid glands not visualized. \par \par \par IMPRESSION: \par 1. Heterogeneous thyroid gland with mild increased vascularity, compatible \par with thyroiditis or grave's disease. \par 2. Multiple thyroid nodules, as described above. A follow-up thyroid \par ultrasound is recommended in 12 months. \par 3. Enlarged level 2 lymph nodes, stable from 11/29/2017.

## 2020-09-11 NOTE — PHYSICAL EXAM
[de-identified] : neck flat and soft, well healed right neck incision, no masses/lesions [Midline] : trachea located in midline position [de-identified] : 3+, cryptic [Normal] : no rashes [FreeTextEntry1] : Ad: external ear wnl, EAC w cerumen removed w curet, TM intact and mobile, ME clear\par As: external ear wnl, EAC w cerumen removed w curet, TM postsurgical, intact and mobile, ME clear

## 2020-09-11 NOTE — CONSULT LETTER
[Dear  ___] : Dear  [unfilled], [Consult Closing:] : Thank you very much for allowing me to participate in the care of this patient.  If you have any questions, please do not hesitate to contact me. [Courtesy Letter:] : I had the pleasure of seeing your patient, [unfilled], in my office today. [Sincerely,] : Sincerely, [James Prabhakar MD] : James Prabhakar MD  [Department of Otolaryngology, Head and Neck Surgery] : Department of Otolaryngology, Head and Neck Surgery [[facility]] : [facility]

## 2020-09-11 NOTE — HISTORY OF PRESENT ILLNESS
[de-identified] : 59F here in followup.\par \par Last seen 2 years ago; since then, she is doing very well with no acute complaints. No hyper/hypothyroid symptoms. No wt loss. No neck pain, no difficulty eating, breathing, swallowing or talking.\par She is s/p excisional biopsy right deep cervical lymph node 12/18/17, pathology benign.\par \par Most recent US neck 7/2018:\par IMPRESSION: \par 1. Heterogeneous thyroid gland with mild increased vascularity, compatible with thyroiditis or grave's disease. \par 2. Multiple thyroid nodules, as described above. A follow-up thyroid ultrasound is recommended in 12 months. \par 3. Enlarged level 2 lymph nodes, stable from 11/29/2017. \par \par ROS otherwise unremarkable.

## 2021-05-14 ENCOUNTER — APPOINTMENT (OUTPATIENT)
Dept: OTOLARYNGOLOGY | Facility: CLINIC | Age: 61
End: 2021-05-14
Payer: COMMERCIAL

## 2021-05-14 VITALS
BODY MASS INDEX: 23.73 KG/M2 | WEIGHT: 139 LBS | HEIGHT: 64 IN | TEMPERATURE: 97.4 F | DIASTOLIC BLOOD PRESSURE: 72 MMHG | HEART RATE: 73 BPM | SYSTOLIC BLOOD PRESSURE: 123 MMHG

## 2021-05-14 PROCEDURE — 99072 ADDL SUPL MATRL&STAF TM PHE: CPT

## 2021-05-14 PROCEDURE — 99213 OFFICE O/P EST LOW 20 MIN: CPT | Mod: 25

## 2021-05-14 PROCEDURE — 69210 REMOVE IMPACTED EAR WAX UNI: CPT | Mod: LT

## 2021-05-14 NOTE — ASSESSMENT
[FreeTextEntry1] : 60F here in followup. Two days ago, she felt left sided ear discomfort with echoes, distorted/distant sounds and her voice was louder than usual. the persisted for around a day or so and then slowly dissipated over the following day and today is back to normal. There is no otorrhea, otalgia, tinnitus or vertigo. Of note, she had left tympanoplasty years ago. She has no other complaints otherwise and feels well. On exam, the neck is soft and flat and there is a well healed right lateral neck incision. Cerumen was removed from the left EAC revealing an intact postsurgical TM which is mobile and an aerated middle ear. Complete and comprehensive head and neck exam is otherwise unremarkable. Prior transient sx likely due to eustachian tube issues, self resolved. Pt reassured. Recommend repeat US, given known multinodular goiter and lymphadenopathy. I will review sonogram with pt thereafter; as long as stable, RTO 1 yr.

## 2021-05-14 NOTE — PHYSICAL EXAM
[de-identified] : neck flat and soft, well healed right neck incision, no masses/lesions [Midline] : trachea located in midline position [de-identified] : 3+, cryptic [Normal] : no rashes [FreeTextEntry1] : Ad: external ear wnl, EAC clear, TM intact and mobile, ME clear\par As: external ear wnl, EAC w cerumen removed w curet, TM postsurgical, intact and mobile, ME clear

## 2021-05-14 NOTE — DATA REVIEWED
[de-identified] : US 7/2018:\par Findings: \par \par RIGHT LOBE: \par Dimensions: 5.7 x 1.3 x 2.1 cm (sagittal x AP x transverse) \par Echotexture: Heterogeneous. \par Vascularity: Mild increased vascularity. \par The right lobe contains three nodules. \par \par Nodule 1: \par Location: Upper pole. \par Dimensions: 0.5 x 0.3 x 0.5 cm (sagittal x AP x transverse) \par Composition: Solid \par For solid nodules or for the solid portion of a partially cystic nodule, \par does the solid portion have any of the following suspicious features? \par - Yes: Hypoechoic \par - No: Microcalcifications \par - No: Irregular margin (infiltrative or microlobulated) \par - No: Taller than wide (AP dimension > transverse) \par - No: Extrathyroidal extension \par - No: Interrupted rim calcification with soft tissue extrusion \par \par Nodule 2: \par Location: Mid-upper portion. \par Dimensions: 0.6 x 0.3 x 0.5 cm (sagittal x AP x transverse) \par Composition: Solid, partially spongiform. \par For solid nodules or for the solid portion of a partially cystic nodule, \par does the solid portion have any of the following suspicious features? \par - No: Hypoechoic \par - No: Microcalcifications \par - No: Irregular margin (infiltrative or microlobulated) \par - No: Taller than wide (AP dimension > transverse) \par - No: Extrathyroidal extension \par - No: Interrupted rim calcification with soft tissue extrusion \par \par Nodule 3: \par Location: Interpolar region. \par Dimensions: 0.7 x 0.4 x 0.5 cm (sagittal x AP x transverse) \par Composition: Partially cystic with solid component. \par For solid nodules or for the solid portion of a partially cystic nodule, \par does the solid portion have any of the following suspicious features? \par - No: Hypoechoic \par - No: Microcalcifications \par - No: Irregular margin (infiltrative or microlobulated) \par - No: Taller than wide (AP dimension > transverse) \par - No: Extrathyroidal extension \par - No: Interrupted rim calcification with soft tissue extrusion \par \par LEFT LOBE: \par Dimensions: 5.4 x 1.2 x 2.4 cm (sagittal x AP x transverse) \par Echotexture: Heterogeneous. \par Vascularity: Mild increased vascularity. \par The left lobe contains one nodule. \par \par Nodule 1: \par Location: Upper pole \par Dimensions: 0.2 x 0.2 x 0.2 cm (sagittal x AP x transverse) \par Composition: Colloid cyst. \par \par ISTHMUS: \par Dimensions: 0.3 cm AP \par The isthmus lobe contains no visible nodules. \par \par \par CERVICAL LYMPH NODE EVALUATION \par - No abnormal lymph nodes are identified in the neck. \par \par RIGHT: There is a 1.8 x 0.9 x 1.5 cm level 2 lymph node. This previously \par measured 3 x 1.1 x 1.7 cm when measured together with an adjacent lymph \par node. If the 2 adjacent lymph nodes are measured together on today's exam, \par they measure 3.1 x 0.9 x 1.5 cm. \par \par LEFT: There is a 3.1 x 1.0 x 1.6 cm level 2 lymph node. This previously \par measured 3.0 x 1.0 x 1.7 cm. \par \par \par PARATHYROID EXAMINATION: \par - Parathyroid glands not visualized. \par \par \par IMPRESSION: \par 1. Heterogeneous thyroid gland with mild increased vascularity, compatible \par with thyroiditis or grave's disease. \par 2. Multiple thyroid nodules, as described above. A follow-up thyroid \par ultrasound is recommended in 12 months. \par 3. Enlarged level 2 lymph nodes, stable from 11/29/2017.

## 2021-05-14 NOTE — HISTORY OF PRESENT ILLNESS
[de-identified] : 60F here in followup.\par \par Two days ago, she felt left sided ear discomfort with echos, distorted/distant sounds and her voice was louder than usual. the persisted for around a day or so and then slowly dissipated over the following day and today is back to normal. There is no otorrhea, otalgia, tinnitus or vertigo. Of note, she had left tympanoplasty years ago.\par \par She otherwise has no acute complaints. No hyper/hypothyroid symptoms. No wt loss. No neck pain, no difficulty eating, breathing, swallowing or talking.\par She is s/p excisional biopsy right deep cervical lymph node 12/18/17, pathology benign.\par \par Most recent US neck 7/2018:\par IMPRESSION: \par 1. Heterogeneous thyroid gland with mild increased vascularity, compatible with thyroiditis or grave's disease. \par 2. Multiple thyroid nodules, as described above. A follow-up thyroid ultrasound is recommended in 12 months. \par 3. Enlarged level 2 lymph nodes, stable from 11/29/2017. \par \par ROS otherwise unremarkable.

## 2021-05-14 NOTE — CONSULT LETTER
[Dear  ___] : Dear  [unfilled], [Courtesy Letter:] : I had the pleasure of seeing your patient, [unfilled], in my office today. [Consult Closing:] : Thank you very much for allowing me to participate in the care of this patient.  If you have any questions, please do not hesitate to contact me. [Sincerely,] : Sincerely, [James Prabhakar MD] : James Prabhakar MD  [Department of Otolaryngology, Head and Neck Surgery] : Department of Otolaryngology, Head and Neck Surgery [[facility]] : [facility]

## 2021-09-24 ENCOUNTER — APPOINTMENT (OUTPATIENT)
Dept: INFECTIOUS DISEASE | Facility: CLINIC | Age: 61
End: 2021-09-24
Payer: COMMERCIAL

## 2021-09-24 VITALS
TEMPERATURE: 97.9 F | HEART RATE: 60 BPM | DIASTOLIC BLOOD PRESSURE: 74 MMHG | BODY MASS INDEX: 24.03 KG/M2 | SYSTOLIC BLOOD PRESSURE: 129 MMHG | WEIGHT: 140 LBS

## 2021-09-24 DIAGNOSIS — N89.8 OTHER SPECIFIED NONINFLAMMATORY DISORDERS OF VAGINA: ICD-10-CM

## 2021-09-24 DIAGNOSIS — A49.1 STREPTOCOCCAL INFECTION, UNSPECIFIED SITE: ICD-10-CM

## 2021-09-24 DIAGNOSIS — R87.5: ICD-10-CM

## 2021-09-24 DIAGNOSIS — M19.90 UNSPECIFIED OSTEOARTHRITIS, UNSPECIFIED SITE: ICD-10-CM

## 2021-09-24 PROCEDURE — 99203 OFFICE O/P NEW LOW 30 MIN: CPT

## 2021-09-24 RX ORDER — CEFPODOXIME PROXETIL 200 MG/1
200 TABLET, FILM COATED ORAL TWICE DAILY
Qty: 10 | Refills: 0 | Status: ACTIVE | COMMUNITY
Start: 2021-09-24 | End: 1900-01-01

## 2021-09-25 NOTE — PHYSICAL EXAM
[General Appearance - Alert] : alert [General Appearance - In No Acute Distress] : in no acute distress [Sclera] : the sclera and conjunctiva were normal [Outer Ear] : the ears and nose were normal in appearance [Neck Appearance] : the appearance of the neck was normal [] : no respiratory distress [Auscultation Breath Sounds / Voice Sounds] : lungs were clear to auscultation bilaterally [Heart Rate And Rhythm] : heart rate was normal and rhythm regular [Heart Sounds] : normal S1 and S2 [Bowel Sounds] : normal bowel sounds [Edema] : there was no peripheral edema [Abdomen Soft] : soft [Abdomen Tenderness] : non-tender [Musculoskeletal - Swelling] : no joint swelling [No Focal Deficits] : no focal deficits [Oriented To Time, Place, And Person] : oriented to person, place, and time

## 2021-09-25 NOTE — PHYSICAL EXAM
[General Appearance - Alert] : alert [General Appearance - In No Acute Distress] : in no acute distress [Sclera] : the sclera and conjunctiva were normal [Outer Ear] : the ears and nose were normal in appearance [Neck Appearance] : the appearance of the neck was normal [] : no respiratory distress [Auscultation Breath Sounds / Voice Sounds] : lungs were clear to auscultation bilaterally [Heart Rate And Rhythm] : heart rate was normal and rhythm regular [Heart Sounds] : normal S1 and S2 [Edema] : there was no peripheral edema [Bowel Sounds] : normal bowel sounds [Abdomen Soft] : soft [Abdomen Tenderness] : non-tender [Musculoskeletal - Swelling] : no joint swelling [No Focal Deficits] : no focal deficits [Oriented To Time, Place, And Person] : oriented to person, place, and time

## 2021-09-27 NOTE — HISTORY OF PRESENT ILLNESS
[Sexually Active] : The patient is sexually active [Monogamous] : monogamous [Vaginal] : vaginal [Men] : with men [FreeTextEntry1] : 61yo menopausal F p/w vaginal discharge and positive vaginal culture with strep pneumoniae. \par \par She was having urinary frequency recently.  She thought it was due to frequent fluid intake.  She noticed she started to have mucus vaginal discharge on underwear around 9/4 as well as vaginal discomfort.  Then sexual intercourse became  uncomfortable so she went to see GYN on 9/9.  GYN thought it is due to vaginosis.  Vaginal PCR neg but vaginal culture grew strep pneumoniae.  UCx also grew strep pneumoniae.  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  She thinks two doses of Augmentin was helpful but still has as mucous vaginal discharge but no vaginal discomfort.  She is menopause since at age 52.  Never had IUD.  She was told by GYN that she has dry vagina and she was prescribed with estrogen cream, which she hasn't used yet.  Pap smea has been always negative (negative 2020). \par \par Denied dysuria, urinary frequency, hematuria, cough, SOB, fever.  No recent PNA or respiratory infection.  Her  is not sick and denied recent PNA, but he visits his mother in [place] frequently.   [de-identified] : rarely active, only with her   [de-identified] : Genital herpes at collage age  [de-identified] : works as PA at [employer]

## 2021-09-27 NOTE — HISTORY OF PRESENT ILLNESS
[Sexually Active] : The patient is sexually active [Monogamous] : monogamous [Vaginal] : vaginal [Men] : with men [FreeTextEntry1] : 59yo menopausal F p/w vaginal discharge and positive vaginal culture with strep pneumoniae. \par \par She was having urinary frequency recently.  She thought it was due to frequent fluid intake.  She noticed she started to have mucus vaginal discharge on underwear around 9/4 as well as vaginal discomfort.  Then sexual intercourse became  uncomfortable so she went to see GYN on 9/9.  GYN thought it is due to vaginosis.  Vaginal PCR neg but vaginal culture grew strep pneumoniae.  UCx also grew strep pneumoniae.  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  She thinks two doses of Augmentin was helpful but still has as mucous vaginal discharge but no vaginal discomfort.  She is menopause since at age 52.  Never had IUD.  She was told by GYN that she has dry vagina and she was prescribed with estrogen cream, which she hasn't used yet.  Pap smea has been always negative (negative 2020). \par \par Denied dysuria, urinary frequency, hematuria, cough, SOB, fever.  No recent PNA or respiratory infection.  Her  is not sick and denied recent PNA, but he visits his mother in [place] frequently.   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

## 2021-09-27 NOTE — DATA REVIEWED
[FreeTextEntry1] : OSH record\par 9/9/21 Vaginitis plus panel all neg\par Genital culture: strep pneumo heavy growth (no susceptibility)

## 2021-09-27 NOTE — ASSESSMENT
[FreeTextEntry1] : 59yo menopausal F p/w vaginal discharge and positive vaginal culture with strep pneumoniae. \par \par While strep pneumo is a common colonizer of respiratory tract, it is uncommon to colonize vaginal sheela, although it can happen.  The frequency of colonization of vaginal sheela among healthy female is unknown, and clinical significance is unclear.  I think she likely acquires vaginal strep pneumo colonization via oral sex.  Since she recently developed mucus vaginal discharge with vaginal discomfort and strep pneumo was isolated from vaginal culture, it is reasonable to assume that her vaginal discharge is due to strep pneumo, and thus treatment is indicated.  The optimal duration of treatment is unknown since there is no clinical data on treating strep pneumo vaginal infection.  Given her mild symptom, and strep pneumo PNA is typically treated with 5 day course of abx, I think it is reasonable to treat her with the same dosage to see if her symptoms improve.\par \par - cefpodoxime 200mg PO q12h x 5 days trial\par - no follow up vaginal culture is recommended, as benefit of treatment of asymptomatic colonization is unclear \par - RTC 2-4 weeks \par \par

## 2021-11-12 ENCOUNTER — APPOINTMENT (OUTPATIENT)
Dept: INFECTIOUS DISEASE | Facility: CLINIC | Age: 61
End: 2021-11-12

## 2022-04-14 NOTE — ED PROVIDER NOTE - NSTIMEPROVIDERCAREINITIATE_GEN_ER
Medication: Xeralto  Dosage: 20 mg  Sig: take one daily with Dinner  Last Refill: 3/12/22  Last Office Visit for this diagnosis or CPE/MWV/PREOP: 3/24/22    Next Appt:  None scheduled  Pertinent labs UTD: Yes      PDMP needs to be reviewed by Provider    Sent to Provider to advise on Refill(s)     19-May-2018 19:57

## 2022-06-14 ENCOUNTER — APPOINTMENT (OUTPATIENT)
Dept: OTOLARYNGOLOGY | Facility: CLINIC | Age: 62
End: 2022-06-14

## 2023-07-18 ENCOUNTER — APPOINTMENT (OUTPATIENT)
Dept: OTOLARYNGOLOGY | Facility: CLINIC | Age: 63
End: 2023-07-18
Payer: COMMERCIAL

## 2023-07-18 VITALS
WEIGHT: 141 LBS | DIASTOLIC BLOOD PRESSURE: 75 MMHG | HEART RATE: 63 BPM | BODY MASS INDEX: 24.07 KG/M2 | SYSTOLIC BLOOD PRESSURE: 126 MMHG | TEMPERATURE: 96.5 F | HEIGHT: 64 IN

## 2023-07-18 DIAGNOSIS — H61.23 IMPACTED CERUMEN, BILATERAL: ICD-10-CM

## 2023-07-18 PROCEDURE — 99213 OFFICE O/P EST LOW 20 MIN: CPT | Mod: 25

## 2023-07-18 PROCEDURE — 69210 REMOVE IMPACTED EAR WAX UNI: CPT

## 2023-07-18 RX ORDER — CIPROFLOXACIN AND DEXAMETHASONE 3; 1 MG/ML; MG/ML
0.3-0.1 SUSPENSION/ DROPS AURICULAR (OTIC) TWICE DAILY
Qty: 1 | Refills: 5 | Status: ACTIVE | COMMUNITY
Start: 2023-07-18 | End: 1900-01-01

## 2023-07-18 NOTE — DATA REVIEWED
[de-identified] : US 7/2018:\par Findings: \par \par RIGHT LOBE: \par Dimensions: 5.7 x 1.3 x 2.1 cm (sagittal x AP x transverse) \par Echotexture: Heterogeneous. \par Vascularity: Mild increased vascularity. \par The right lobe contains three nodules. \par \par Nodule 1: \par Location: Upper pole. \par Dimensions: 0.5 x 0.3 x 0.5 cm (sagittal x AP x transverse) \par Composition: Solid \par For solid nodules or for the solid portion of a partially cystic nodule, \par does the solid portion have any of the following suspicious features? \par - Yes: Hypoechoic \par - No: Microcalcifications \par - No: Irregular margin (infiltrative or microlobulated) \par - No: Taller than wide (AP dimension > transverse) \par - No: Extrathyroidal extension \par - No: Interrupted rim calcification with soft tissue extrusion \par \par Nodule 2: \par Location: Mid-upper portion. \par Dimensions: 0.6 x 0.3 x 0.5 cm (sagittal x AP x transverse) \par Composition: Solid, partially spongiform. \par For solid nodules or for the solid portion of a partially cystic nodule, \par does the solid portion have any of the following suspicious features? \par - No: Hypoechoic \par - No: Microcalcifications \par - No: Irregular margin (infiltrative or microlobulated) \par - No: Taller than wide (AP dimension > transverse) \par - No: Extrathyroidal extension \par - No: Interrupted rim calcification with soft tissue extrusion \par \par Nodule 3: \par Location: Interpolar region. \par Dimensions: 0.7 x 0.4 x 0.5 cm (sagittal x AP x transverse) \par Composition: Partially cystic with solid component. \par For solid nodules or for the solid portion of a partially cystic nodule, \par does the solid portion have any of the following suspicious features? \par - No: Hypoechoic \par - No: Microcalcifications \par - No: Irregular margin (infiltrative or microlobulated) \par - No: Taller than wide (AP dimension > transverse) \par - No: Extrathyroidal extension \par - No: Interrupted rim calcification with soft tissue extrusion \par \par LEFT LOBE: \par Dimensions: 5.4 x 1.2 x 2.4 cm (sagittal x AP x transverse) \par Echotexture: Heterogeneous. \par Vascularity: Mild increased vascularity. \par The left lobe contains one nodule. \par \par Nodule 1: \par Location: Upper pole \par Dimensions: 0.2 x 0.2 x 0.2 cm (sagittal x AP x transverse) \par Composition: Colloid cyst. \par \par ISTHMUS: \par Dimensions: 0.3 cm AP \par The isthmus lobe contains no visible nodules. \par \par \par CERVICAL LYMPH NODE EVALUATION \par - No abnormal lymph nodes are identified in the neck. \par \par RIGHT: There is a 1.8 x 0.9 x 1.5 cm level 2 lymph node. This previously \par measured 3 x 1.1 x 1.7 cm when measured together with an adjacent lymph \par node. If the 2 adjacent lymph nodes are measured together on today's exam, \par they measure 3.1 x 0.9 x 1.5 cm. \par \par LEFT: There is a 3.1 x 1.0 x 1.6 cm level 2 lymph node. This previously \par measured 3.0 x 1.0 x 1.7 cm. \par \par \par PARATHYROID EXAMINATION: \par - Parathyroid glands not visualized. \par \par \par IMPRESSION: \par 1. Heterogeneous thyroid gland with mild increased vascularity, compatible \par with thyroiditis or grave's disease. \par 2. Multiple thyroid nodules, as described above. A follow-up thyroid \par ultrasound is recommended in 12 months. \par 3. Enlarged level 2 lymph nodes, stable from 11/29/2017.

## 2023-07-18 NOTE — HISTORY OF PRESENT ILLNESS
[de-identified] : 62F here in followup.\par \par For the past 3-4 days, she c/o left sided ear fullness, itching, diminished hearing and drainage. She recently was swimming and thinks water got inside the ear. There is no tinnitus or vertigo.\par \par Of note, she had left tympanoplasty years ago.\par Recent postnasal drip she thinks is from her allergies; she restarted shots but missed some recent doses.\par \par She otherwise has no acute complaints. No hyper/hypothyroid symptoms. No wt loss. No neck pain, no difficulty eating, breathing, swallowing or talking.\par She is s/p excisional biopsy right deep cervical lymph node 12/18/17, pathology benign.\par \par Most recent US neck 7/2018:\par IMPRESSION: \par 1. Heterogeneous thyroid gland with mild increased vascularity, compatible with thyroiditis or grave's disease. \par 2. Multiple thyroid nodules, as described below. A follow-up thyroid ultrasound is recommended in 12 months. \par 3. Enlarged level 2 lymph nodes, stable from 11/29/2017. \par \par ROS otherwise unremarkable.

## 2023-07-18 NOTE — CONSULT LETTER
[Dear  ___] : Dear  [unfilled], [Courtesy Letter:] : I had the pleasure of seeing your patient, [unfilled], in my office today. [Consult Closing:] : Thank you very much for allowing me to participate in the care of this patient.  If you have any questions, please do not hesitate to contact me. [Sincerely,] : Sincerely, [James Prabhakar MD] : James Prabhakar MD  [Department of Otolaryngology, Head and Neck Surgery] : Department of Otolaryngology, Head and Neck Surgery [[facility]] : [facility]

## 2023-07-18 NOTE — ASSESSMENT
[FreeTextEntry1] : 62F here in followup. For the past 3-4 days, she c/o left sided ear fullness, itching, diminished hearing and drainage. She recently was swimming and thinks water got inside the ear. There is no tinnitus or vertigo.\par She has no other complaints otherwise and feels well. On exam, the neck is soft and flat and there is a well healed right lateral neck incision. Moist debris and cerumen was removed from the left EAC w underlying EAC erythema and an intact postsurgical TM which is mobile and an aerated middle ear. Complete and comprehensive head and neck exam is otherwise unremarkable.\par Left ear sx due to acute otitis externa - for now, maintain dry ear and start ciprodex. RTO 3 weeks to ensure resolution. At f/u will send to repeat US thyroid/neck.

## 2023-07-18 NOTE — PHYSICAL EXAM
[de-identified] : neck flat and soft, well healed right neck incision, no masses/lesions [Midline] : trachea located in midline position [de-identified] : 3+, cryptic [Normal] : no rashes [FreeTextEntry1] : Ad: external ear wnl, EAC clear, TM intact and mobile, ME clear\par As: external ear wnl, EAC w moist cerumen and debris removed w suction; underlying EAC erythema. TM postsurgical, intact and mobile, ME clear

## 2023-08-08 ENCOUNTER — APPOINTMENT (OUTPATIENT)
Dept: OTOLARYNGOLOGY | Facility: CLINIC | Age: 63
End: 2023-08-08

## 2023-08-10 ENCOUNTER — APPOINTMENT (OUTPATIENT)
Dept: OTOLARYNGOLOGY | Facility: CLINIC | Age: 63
End: 2023-08-10
Payer: COMMERCIAL

## 2023-08-10 VITALS
SYSTOLIC BLOOD PRESSURE: 102 MMHG | WEIGHT: 140 LBS | HEIGHT: 64 IN | DIASTOLIC BLOOD PRESSURE: 77 MMHG | HEART RATE: 71 BPM | BODY MASS INDEX: 23.9 KG/M2

## 2023-08-10 DIAGNOSIS — H92.02 OTALGIA, LEFT EAR: ICD-10-CM

## 2023-08-10 DIAGNOSIS — E04.1 NONTOXIC SINGLE THYROID NODULE: ICD-10-CM

## 2023-08-10 PROCEDURE — 99213 OFFICE O/P EST LOW 20 MIN: CPT

## 2023-08-10 NOTE — PHYSICAL EXAM
[Midline] : trachea located in midline position [Normal] : no rashes [de-identified] : neck flat and soft, well healed right neck incision, no masses/lesions [de-identified] : 3+, cryptic [FreeTextEntry1] : Ad: external ear wnl, EAC clear, TM intact and mobile, ME clear As: external ear wnl, EAC clear and dry, TM postsurgical, intact and mobile, ME clear

## 2023-08-10 NOTE — ASSESSMENT
[FreeTextEntry1] : 62F here in followup. Since last seen 3 weeks ago she finished course ciprodex and her prior left ear sx have since resolved. She has no acute complaints today. At prior visit treat for acute left otitis externa. On exam, the neck is soft and flat and there is a well healed right lateral neck incision. EACs are both clear and dry with an intact postsurgical left TM which is mobile. The thyroid is enlarged, soft, nontender and palpable Complete and comprehensive head and neck exam is otherwise unremarkable. Left ear sx due to acute otitis externa, now resolved. Maintain dry ears. Given h/o thyroid nodules, she is due for new sonogram - will get and review w pt thereafter.

## 2023-08-10 NOTE — HISTORY OF PRESENT ILLNESS
[de-identified] : 62F here in followup.  Since last seen, she finished course ciprodex and her prior left ear sx have since resolved. She has no acute complaints today. At prior visit treat for acute left otitis externa.  Of note, she had left tympanoplasty years ago. Recent postnasal drip she thinks is from her allergies; she restarted shots but missed some recent doses.  She otherwise has no acute complaints. No hyper/hypothyroid symptoms. No wt loss. No neck pain, no difficulty eating, breathing, swallowing or talking. She is s/p excisional biopsy right deep cervical lymph node 12/18/17, pathology benign.  Most recent US neck 7/2018: IMPRESSION:  1. Heterogeneous thyroid gland with mild increased vascularity, compatible with thyroiditis or grave's disease.  2. Multiple thyroid nodules, as described below. A follow-up thyroid ultrasound is recommended in 12 months.  3. Enlarged level 2 lymph nodes, stable from 11/29/2017.   ROS otherwise unremarkable.

## 2023-08-10 NOTE — DATA REVIEWED
[de-identified] : US 7/2018:\par  Findings: \par  \par  RIGHT LOBE: \par  Dimensions: 5.7 x 1.3 x 2.1 cm (sagittal x AP x transverse) \par  Echotexture: Heterogeneous. \par  Vascularity: Mild increased vascularity. \par  The right lobe contains three nodules. \par  \par  Nodule 1: \par  Location: Upper pole. \par  Dimensions: 0.5 x 0.3 x 0.5 cm (sagittal x AP x transverse) \par  Composition: Solid \par  For solid nodules or for the solid portion of a partially cystic nodule, \par  does the solid portion have any of the following suspicious features? \par  - Yes: Hypoechoic \par  - No: Microcalcifications \par  - No: Irregular margin (infiltrative or microlobulated) \par  - No: Taller than wide (AP dimension > transverse) \par  - No: Extrathyroidal extension \par  - No: Interrupted rim calcification with soft tissue extrusion \par  \par  Nodule 2: \par  Location: Mid-upper portion. \par  Dimensions: 0.6 x 0.3 x 0.5 cm (sagittal x AP x transverse) \par  Composition: Solid, partially spongiform. \par  For solid nodules or for the solid portion of a partially cystic nodule, \par  does the solid portion have any of the following suspicious features? \par  - No: Hypoechoic \par  - No: Microcalcifications \par  - No: Irregular margin (infiltrative or microlobulated) \par  - No: Taller than wide (AP dimension > transverse) \par  - No: Extrathyroidal extension \par  - No: Interrupted rim calcification with soft tissue extrusion \par  \par  Nodule 3: \par  Location: Interpolar region. \par  Dimensions: 0.7 x 0.4 x 0.5 cm (sagittal x AP x transverse) \par  Composition: Partially cystic with solid component. \par  For solid nodules or for the solid portion of a partially cystic nodule, \par  does the solid portion have any of the following suspicious features? \par  - No: Hypoechoic \par  - No: Microcalcifications \par  - No: Irregular margin (infiltrative or microlobulated) \par  - No: Taller than wide (AP dimension > transverse) \par  - No: Extrathyroidal extension \par  - No: Interrupted rim calcification with soft tissue extrusion \par  \par  LEFT LOBE: \par  Dimensions: 5.4 x 1.2 x 2.4 cm (sagittal x AP x transverse) \par  Echotexture: Heterogeneous. \par  Vascularity: Mild increased vascularity. \par  The left lobe contains one nodule. \par  \par  Nodule 1: \par  Location: Upper pole \par  Dimensions: 0.2 x 0.2 x 0.2 cm (sagittal x AP x transverse) \par  Composition: Colloid cyst. \par  \par  ISTHMUS: \par  Dimensions: 0.3 cm AP \par  The isthmus lobe contains no visible nodules. \par  \par  \par  CERVICAL LYMPH NODE EVALUATION \par  - No abnormal lymph nodes are identified in the neck. \par  \par  RIGHT: There is a 1.8 x 0.9 x 1.5 cm level 2 lymph node. This previously \par  measured 3 x 1.1 x 1.7 cm when measured together with an adjacent lymph \par  node. If the 2 adjacent lymph nodes are measured together on today's exam, \par  they measure 3.1 x 0.9 x 1.5 cm. \par  \par  LEFT: There is a 3.1 x 1.0 x 1.6 cm level 2 lymph node. This previously \par  measured 3.0 x 1.0 x 1.7 cm. \par  \par  \par  PARATHYROID EXAMINATION: \par  - Parathyroid glands not visualized. \par  \par  \par  IMPRESSION: \par  1. Heterogeneous thyroid gland with mild increased vascularity, compatible \par  with thyroiditis or grave's disease. \par  2. Multiple thyroid nodules, as described above. A follow-up thyroid \par  ultrasound is recommended in 12 months. \par  3. Enlarged level 2 lymph nodes, stable from 11/29/2017.

## 2023-08-10 NOTE — CONSULT LETTER
[Dear  ___] : Dear  [unfilled], [Courtesy Letter:] : I had the pleasure of seeing your patient, [unfilled], in my office today. [Consult Closing:] : Thank you very much for allowing me to participate in the care of this patient.  If you have any questions, please do not hesitate to contact me. [Sincerely,] : Sincerely, [James Prabhakar MD] : James Prabhakar MD  [Department of Otolaryngology, Head and Neck Surgery] : Department of Otolaryngology, Head and Neck Surgery [[facility]] : [facility]

## 2023-10-04 ENCOUNTER — APPOINTMENT (OUTPATIENT)
Dept: UROLOGY | Facility: CLINIC | Age: 63
End: 2023-10-04

## 2024-01-01 ENCOUNTER — EMERGENCY (EMERGENCY)
Facility: HOSPITAL | Age: 64
LOS: 1 days | Discharge: HOME | End: 2024-01-01
Attending: STUDENT IN AN ORGANIZED HEALTH CARE EDUCATION/TRAINING PROGRAM | Admitting: STUDENT IN AN ORGANIZED HEALTH CARE EDUCATION/TRAINING PROGRAM
Payer: COMMERCIAL

## 2024-01-01 VITALS
TEMPERATURE: 98 F | HEIGHT: 64 IN | DIASTOLIC BLOOD PRESSURE: 70 MMHG | RESPIRATION RATE: 17 BRPM | WEIGHT: 145.06 LBS | OXYGEN SATURATION: 99 % | HEART RATE: 75 BPM | SYSTOLIC BLOOD PRESSURE: 117 MMHG

## 2024-01-01 VITALS
RESPIRATION RATE: 18 BRPM | DIASTOLIC BLOOD PRESSURE: 63 MMHG | SYSTOLIC BLOOD PRESSURE: 105 MMHG | OXYGEN SATURATION: 96 % | HEART RATE: 62 BPM | TEMPERATURE: 98 F

## 2024-01-01 DIAGNOSIS — Z98.890 OTHER SPECIFIED POSTPROCEDURAL STATES: Chronic | ICD-10-CM

## 2024-01-01 DIAGNOSIS — Z98.89 OTHER SPECIFIED POSTPROCEDURAL STATES: Chronic | ICD-10-CM

## 2024-01-01 DIAGNOSIS — Z88.8 ALLERGY STATUS TO OTHER DRUGS, MEDICAMENTS AND BIOLOGICAL SUBSTANCES: ICD-10-CM

## 2024-01-01 DIAGNOSIS — R11.2 NAUSEA WITH VOMITING, UNSPECIFIED: ICD-10-CM

## 2024-01-01 DIAGNOSIS — Z98.891 HISTORY OF UTERINE SCAR FROM PREVIOUS SURGERY: Chronic | ICD-10-CM

## 2024-01-01 DIAGNOSIS — R19.7 DIARRHEA, UNSPECIFIED: ICD-10-CM

## 2024-01-01 PROCEDURE — 96374 THER/PROPH/DIAG INJ IV PUSH: CPT

## 2024-01-01 PROCEDURE — 36415 COLL VENOUS BLD VENIPUNCTURE: CPT

## 2024-01-01 PROCEDURE — 85025 COMPLETE CBC W/AUTO DIFF WBC: CPT

## 2024-01-01 PROCEDURE — 99284 EMERGENCY DEPT VISIT MOD MDM: CPT | Mod: 25

## 2024-01-01 PROCEDURE — 80053 COMPREHEN METABOLIC PANEL: CPT

## 2024-01-01 PROCEDURE — 99284 EMERGENCY DEPT VISIT MOD MDM: CPT

## 2024-01-01 RX ORDER — ONDANSETRON 8 MG/1
4 TABLET, FILM COATED ORAL ONCE
Refills: 0 | Status: DISCONTINUED | OUTPATIENT
Start: 2024-01-01 | End: 2024-01-05

## 2024-01-01 RX ORDER — FAMOTIDINE 10 MG/ML
20 INJECTION INTRAVENOUS ONCE
Refills: 0 | Status: DISCONTINUED | OUTPATIENT
Start: 2024-01-01 | End: 2024-01-05

## 2024-01-01 RX ORDER — ONDANSETRON 8 MG/1
1 TABLET, FILM COATED ORAL
Qty: 20 | Refills: 0
Start: 2024-01-01

## 2024-01-01 RX ORDER — ONDANSETRON 8 MG/1
4 TABLET, FILM COATED ORAL ONCE
Refills: 0 | Status: COMPLETED | OUTPATIENT
Start: 2024-01-01 | End: 2024-01-01

## 2024-01-01 RX ADMIN — ONDANSETRON 4 MILLIGRAM(S): 8 TABLET, FILM COATED ORAL at 17:41

## 2024-01-01 RX ADMIN — Medication 30 MILLILITER(S): at 17:41

## 2024-01-01 NOTE — ED ADULT NURSE NOTE - NSFALLUNIVINTERV_ED_ALL_ED
Bed/Stretcher in lowest position, wheels locked, appropriate side rails in place/Call bell, personal items and telephone in reach/Instruct patient to call for assistance before getting out of bed/chair/stretcher/Non-slip footwear applied when patient is off stretcher/[place] to call system/Physically safe environment - no spills, clutter or unnecessary equipment/Purposeful proactive rounding/Room/bathroom lighting operational, light cord in reach Bed/Stretcher in lowest position, wheels locked, appropriate side rails in place/Call bell, personal items and telephone in reach/Instruct patient to call for assistance before getting out of bed/chair/stretcher/Non-slip footwear applied when patient is off stretcher/[place] to call system/Physically safe environment - no spills, clutter or unnecessary equipment/Purposeful proactive rounding/Room/bathroom lighting operational, light cord in reach

## 2024-01-01 NOTE — ED PROVIDER NOTE - NSICDXPASTMEDICALHX_GEN_ALL_CORE_FT
PAST MEDICAL HISTORY:  Lumbar disc herniation     Meniscal injury, right, initial encounter     Primary osteoarthritis of right knee hx of microdiscectomy

## 2024-01-01 NOTE — ED PROVIDER NOTE - PATIENT PORTAL LINK FT
You can access the FollowMyHealth Patient Portal offered by [facility] by registering at the following website: [email or web address]. By joining [facility]’s FollowMyHealth portal, you will also be able to view your health information using other applications (apps) compatible with our system. You can access the FollowMyHealth Patient Portal offered by [facility] by registering at the following website: [email or web address]. By joining [facility]’s FollowMyHealth portal, you will also be able to view your health information using other applications (apps) compatible with our system.

## 2024-01-01 NOTE — ED PROVIDER NOTE - CLINICAL SUMMARY MEDICAL DECISION MAKING FREE TEXT BOX
treated symptomatically. screenins labs unremarkable. abdo exam benign. feeling moderately better in ED. will dc

## 2024-01-01 NOTE — ED PROVIDER NOTE - OBJECTIVE STATEMENT
63f. had several etoh drinks last night. since 3 am feeling queasy.vonmited, then retching. no abdominal pain. just soreness from vomiting.

## 2024-01-01 NOTE — ED ADULT NURSE NOTE - OBJECTIVE STATEMENT
63 year old F patient with c/o of nausea & multiple episodes of vomitting since this morning after drinking a lot last night.  NO distress noted.  BReathing easily  and unlabored.  Denies everyday drinking.  NO tremors or slurred speech noted.

## 2024-01-01 NOTE — ED ADULT NURSE NOTE - SCORE
4 Clindamycin Counseling: I counseled the patient regarding use of clindamycin as an antibiotic for prophylactic and/or therapeutic purposes. Clindamycin is active against numerous classes of bacteria, including skin bacteria. Side effects may include nausea, diarrhea, gastrointestinal upset, rash, hives, yeast infections, and in rare cases, colitis.

## 2024-01-01 NOTE — ED PROVIDER NOTE - PHYSICAL EXAMINATION
General: Awake, alert and oriented. No acute distress.   Skin:  Appropriate color for ethnicity  HENMT: head normocephalic and atraumatic  EYES: Conjunctiva clear. nonicteric sclera  Cardiac: well perfused  Respiratory: breathing comfortably on room air  Abdominal: nondistended, soft, notnender  MSK:  no visualized external signs of trauma. no apparent deficits in ROM of any extremity  Neurological: The patient is awake, alert and oriented with normal speech. CN 2-12 grossly intact. no apparent deficits. Memory is normal and thought process is intact. moving all extremities spontaneously   Psychiatric: Appropriate mood and affect. Good judgement and insight

## 2024-01-01 NOTE — ED ADULT TRIAGE NOTE - CHIEF COMPLAINT QUOTE
Pt presents to ED C/O nausea, multiple episodes of vomiting, feeling " lightheaded", loose stools x 12 hrs. Pt states, " I drank alcohol last night and mixed different drinks, usually when I feel sick from drinking I'll vomit a few times but this time my symptoms are lasting I can't keep anything down".

## 2024-01-05 ENCOUNTER — APPOINTMENT (OUTPATIENT)
Dept: OTOLARYNGOLOGY | Facility: CLINIC | Age: 64
End: 2024-01-05

## 2024-09-24 ENCOUNTER — APPOINTMENT (OUTPATIENT)
Dept: OTOLARYNGOLOGY | Facility: CLINIC | Age: 64
End: 2024-09-24
Payer: COMMERCIAL

## 2024-09-24 DIAGNOSIS — R09.81 NASAL CONGESTION: ICD-10-CM

## 2024-09-24 DIAGNOSIS — E04.1 NONTOXIC SINGLE THYROID NODULE: ICD-10-CM

## 2024-09-24 DIAGNOSIS — H61.23 IMPACTED CERUMEN, BILATERAL: ICD-10-CM

## 2024-09-24 PROCEDURE — 99214 OFFICE O/P EST MOD 30 MIN: CPT | Mod: 25

## 2024-09-24 PROCEDURE — 99213 OFFICE O/P EST LOW 20 MIN: CPT | Mod: 25

## 2024-09-24 PROCEDURE — 69210 REMOVE IMPACTED EAR WAX UNI: CPT

## 2024-09-24 NOTE — HISTORY OF PRESENT ILLNESS
[de-identified] : 63F here in followup.  She is here for general evaluation. Yesterday was found to have right DVT after plane ride and is now on AC. There is also mild nasal dryness and congestion.  Of note, she had left tympanoplasty years ago. Recent postnasal drip she thinks is from her allergies.  She otherwise has no acute complaints. No hyper/hypothyroid symptoms. No wt loss. No neck pain, no difficulty eating, breathing, swallowing or talking. She is s/p excisional biopsy right deep cervical lymph node 12/18/17, pathology benign.  Most recent US neck 11/2023: -Subcentimeter Spongiform right upper pole nodule does not need follow-up and neither do the multitude of tiny cysts  ROS otherwise unremarkable.

## 2024-09-24 NOTE — PHYSICAL EXAM
[Midline] : trachea located in midline position [Normal] : no rashes [de-identified] : neck flat and soft, well healed right neck incision, no masses/lesions [de-identified] : 3+, cryptic [FreeTextEntry1] : Ad: external ear wnl, EAC w cerumen removed w curet, TM intact and mobile, ME clear As: external ear wnl, EAC clear and dry, TM postsurgical, pinpoint posterior perf, ME clear

## 2024-09-24 NOTE — DATA REVIEWED
[de-identified] : 11 11/2023: The thyroid isthmus thickness is 0.3 cm.  The right lobe measures 5.9 x 1.1 x 2.1 cm in sagittal x AP x transverse dimensions.   The left lobe measures 5.7 x 0.9 x 1.7 cm in sagittal x AP x transverse dimensions.   Overall thyroid echotexture and blood flow: There is a normal thyroid echotexture and blood flow.  Thyroid nodule assessment: There are numerous cysts throughout both lobes  We evaluate up to the four most suspicious nodules as per ACR recommendations. We use the following TI-RADS lexicon:  *Composition: Cystic, almost cystic, spongiform, mixed, almost solid, solid *Echotexture: Anechoic, isoechoic or hyperechoic, hypoechoic, very hypoechoic  *Shape: Wider than tall, taller than wide *Margins: Smooth, ill-defined, lobulated or irregular, extrathyroidal extension *Calcification: Macrocalcifications, peripheral rim calcification, punctate foci  Nodule #1: Location: Upper pole right Size: 0.9 x 0.4 x 0.8 cm Morphology: Spongiform TiRads 0   There are no suspicious cervical lymph nodes.  IMPRESSION:  Subcentimeter Spongiform right upper pole nodule does not need follow-up and neither do the multitude of tiny cysts.  US 7/2018: Findings:   RIGHT LOBE:  Dimensions: 5.7 x 1.3 x 2.1 cm (sagittal x AP x transverse)  Echotexture: Heterogeneous.  Vascularity: Mild increased vascularity.  The right lobe contains three nodules.   Nodule 1:  Location: Upper pole.  Dimensions: 0.5 x 0.3 x 0.5 cm (sagittal x AP x transverse)  Composition: Solid  For solid nodules or for the solid portion of a partially cystic nodule,  does the solid portion have any of the following suspicious features?  - Yes: Hypoechoic  - No: Microcalcifications  - No: Irregular margin (infiltrative or microlobulated)  - No: Taller than wide (AP dimension > transverse)  - No: Extrathyroidal extension  - No: Interrupted rim calcification with soft tissue extrusion   Nodule 2:  Location: Mid-upper portion.  Dimensions: 0.6 x 0.3 x 0.5 cm (sagittal x AP x transverse)  Composition: Solid, partially spongiform.  For solid nodules or for the solid portion of a partially cystic nodule,  does the solid portion have any of the following suspicious features?  - No: Hypoechoic  - No: Microcalcifications  - No: Irregular margin (infiltrative or microlobulated)  - No: Taller than wide (AP dimension > transverse)  - No: Extrathyroidal extension  - No: Interrupted rim calcification with soft tissue extrusion   Nodule 3:  Location: Interpolar region.  Dimensions: 0.7 x 0.4 x 0.5 cm (sagittal x AP x transverse)  Composition: Partially cystic with solid component.  For solid nodules or for the solid portion of a partially cystic nodule,  does the solid portion have any of the following suspicious features?  - No: Hypoechoic  - No: Microcalcifications  - No: Irregular margin (infiltrative or microlobulated)  - No: Taller than wide (AP dimension > transverse)  - No: Extrathyroidal extension  - No: Interrupted rim calcification with soft tissue extrusion   LEFT LOBE:  Dimensions: 5.4 x 1.2 x 2.4 cm (sagittal x AP x transverse)  Echotexture: Heterogeneous.  Vascularity: Mild increased vascularity.  The left lobe contains one nodule.   Nodule 1:  Location: Upper pole  Dimensions: 0.2 x 0.2 x 0.2 cm (sagittal x AP x transverse)  Composition: Colloid cyst.   ISTHMUS:  Dimensions: 0.3 cm AP  The isthmus lobe contains no visible nodules.    CERVICAL LYMPH NODE EVALUATION  - No abnormal lymph nodes are identified in the neck.   RIGHT: There is a 1.8 x 0.9 x 1.5 cm level 2 lymph node. This previously  measured 3 x 1.1 x 1.7 cm when measured together with an adjacent lymph  node. If the 2 adjacent lymph nodes are measured together on today's exam,  they measure 3.1 x 0.9 x 1.5 cm.   LEFT: There is a 3.1 x 1.0 x 1.6 cm level 2 lymph node. This previously  measured 3.0 x 1.0 x 1.7 cm.    PARATHYROID EXAMINATION:  - Parathyroid glands not visualized.    IMPRESSION:  1. Heterogeneous thyroid gland with mild increased vascularity, compatible  with thyroiditis or grave's disease.  2. Multiple thyroid nodules, as described above. A follow-up thyroid  ultrasound is recommended in 12 months.  3. Enlarged level 2 lymph nodes, stable from 11/29/2017.

## 2024-09-24 NOTE — CONSULT LETTER
[Dear  ___] : Dear  [unfilled], [Courtesy Letter:] : I had the pleasure of seeing your patient, [unfilled], in my office today. [Consult Closing:] : Thank you very much for allowing me to participate in the care of this patient.  If you have any questions, please do not hesitate to contact me. [Sincerely,] : Sincerely, [James Prabhakar MD] : James Prabhakar MD  [Department of Otolaryngology, Head and Neck Surgery] : Department of Otolaryngology, Head and Neck Surgery [[facility]] : [facility]

## 2024-09-24 NOTE — ASSESSMENT
[FreeTextEntry1] : 63F here for general evaluation. Yesterday was found to have right DVT after plane ride and is now on AC. There is also mild nasal dryness and congestion. On exam, the neck is soft and flat and there is a well healed right lateral neck incision. Cerumen was removed from both EACs. The thyroid is enlarged, soft, nontender and palpable. Complete and comprehensive head and neck exam is otherwise unremarkable. -Rhinitis -> saline and flonase as discussed -Thyroid -> recent sonogram unremarkable as above, can repeat in 2 years -Cerumen removed --RTO as needed.

## 2024-09-30 ENCOUNTER — APPOINTMENT (OUTPATIENT)
Dept: VASCULAR SURGERY | Facility: CLINIC | Age: 64
End: 2024-09-30
Payer: COMMERCIAL

## 2024-09-30 DIAGNOSIS — M79.605 PAIN IN LEFT LEG: ICD-10-CM

## 2024-09-30 DIAGNOSIS — Z78.9 OTHER SPECIFIED HEALTH STATUS: ICD-10-CM

## 2024-09-30 DIAGNOSIS — I82.4Z1 ACUTE EMBOLISM AND THROMBOSIS OF UNSPECIFIED DEEP VEINS OF RIGHT DISTAL LOWER EXTREMITY: ICD-10-CM

## 2024-09-30 PROCEDURE — 99204 OFFICE O/P NEW MOD 45 MIN: CPT

## 2024-09-30 PROCEDURE — 93970 EXTREMITY STUDY: CPT

## 2024-09-30 RX ORDER — APIXABAN 5 MG/1
5 TABLET, FILM COATED ORAL
Refills: 0 | Status: ACTIVE | COMMUNITY

## 2024-09-30 RX ORDER — GABAPENTIN 300 MG/1
300 CAPSULE ORAL
Refills: 0 | Status: ACTIVE | COMMUNITY

## 2024-09-30 RX ORDER — GLUCOSAMINE HCL/CHONDROITIN SU 500-400 MG
3 CAPSULE ORAL
Refills: 0 | Status: ACTIVE | COMMUNITY

## 2024-09-30 RX ORDER — ACETAMINOPHEN 500 MG
500 TABLET ORAL
Refills: 0 | Status: ACTIVE | COMMUNITY

## 2024-10-02 NOTE — REVIEW OF SYSTEMS
[Negative] : Heme/Lymph [As Noted in HPI] : as noted in HPI [Limb Pain] : limb pain [Chest Pain] : no chest pain [Shortness Of Breath] : no shortness of breath

## 2024-10-02 NOTE — ASSESSMENT
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 Noted to have R soleal vein DVT and was started on Eliquis.  On exam, both legs are well perfused, no evidence of edema or skin discoloration. Palpable peripheral pulses. BL LE venous doppler was done in the office that demonstrated no evidence of DVT/SVT. We discussed the findings and explained there is no evidence of DVT in either lower extremity. She should stop Eliquis at this point. No additional vascular intervention is needed.  I spent a total of 40 minutes in this encounter.

## 2024-10-02 NOTE — HISTORY OF PRESENT ILLNESS
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 Pt reports that she developed severe pain in her LLE, however she was noted to have R soleal vein DVT and was started on Eliquis. No h/o DVT or PE in pt or family, not on OCPs, no recent surgeries. Denies CP, SOB.

## 2024-10-02 NOTE — PROCEDURE
[FreeTextEntry1] : BL LE venous doppler was done in the office that demonstrated no evidence of DVT/SVT

## 2024-10-02 NOTE — HISTORY OF PRESENT ILLNESS
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 Pt reports that she developed severe pain in her LLE, however she was noted to have R soleal vein DVT and was started on Eliquis. No h/o DVT or PE in pt or family, not on OCPs, no recent surgeries. Denies CP, SOB.

## 2024-10-02 NOTE — PHYSICAL EXAM
[Respiratory Effort] : normal respiratory effort [Normal Heart Sounds] : normal heart sounds [Alert] : alert [Calm] : calm [2+] : left 2+ [No Rash or Lesion] : No rash or lesion [Ankle Swelling (On Exam)] : not present [Varicose Veins Of Lower Extremities] : not present [] : not present [Abdomen Tenderness] : ~T ~M No abdominal tenderness [de-identified] : WN/WD, NAD [de-identified] : NC/AT [de-identified] : supple [de-identified] : FROM [de-identified] : grossly intact

## 2024-10-02 NOTE — ADDENDUM
[FreeTextEntry1] : This note was written by Nevin BLACKMAN, acting as a scribe for Dr. Adi Chau.  I, Dr. Adi Chau, have read and attest that all the information, medical decision-making, and discharge instructions within are true and accurate.  I, Dr. Adi Chau, personally performed the evaluation and management (E/M) services for this new patient.  That E/M includes conducting the initial examination, assessing all conditions, and establishing the plan of care.  Today, my ACP, Nevin BLACKMAN, was here to observe my evaluation and management services for this patient to be followed going forward.

## 2024-10-02 NOTE — ASSESSMENT
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 Noted to have R soleal vein DVT and was started on Eliquis.  On exam, both legs are well perfused, no evidence of edema or skin discoloration. Palpable peripheral pulses. BL LE venous doppler was done in the office that demonstrated no evidence of DVT/SVT. We discussed the findings and explained there is no evidence of DVT in either lower extremity. She should stop Eliquis at this point. No additional vascular intervention is needed.  I spent a total of 40 minutes in this encounter.

## 2024-10-02 NOTE — ASSESSMENT
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 Noted to have R soleal vein DVT and was started on Eliquis.  On exam, both legs are well perfused, no evidence of edema or skin discoloration. Palpable peripheral pulses. BL LE venous doppler was done in the office that demonstrated no evidence of DVT/SVT. We discussed the findings and explained there is no evidence of DVT in either lower extremity. She should stop Eliquis at this point. No additional vascular intervention is needed.  I spent a total of 40 minutes in this encounter.

## 2024-10-02 NOTE — PHYSICAL EXAM
[Respiratory Effort] : normal respiratory effort [Normal Heart Sounds] : normal heart sounds [Alert] : alert [Calm] : calm [2+] : left 2+ [No Rash or Lesion] : No rash or lesion [Ankle Swelling (On Exam)] : not present [Varicose Veins Of Lower Extremities] : not present [] : not present [Abdomen Tenderness] : ~T ~M No abdominal tenderness [de-identified] : WN/WD, NAD [de-identified] : NC/AT [de-identified] : supple [de-identified] : FROM [de-identified] : grossly intact

## 2024-10-02 NOTE — PHYSICAL EXAM
[Respiratory Effort] : normal respiratory effort [Normal Heart Sounds] : normal heart sounds [Alert] : alert [Calm] : calm [2+] : left 2+ [No Rash or Lesion] : No rash or lesion [Ankle Swelling (On Exam)] : not present [Varicose Veins Of Lower Extremities] : not present [] : not present [Abdomen Tenderness] : ~T ~M No abdominal tenderness [de-identified] : WN/WD, NAD [de-identified] : NC/AT [de-identified] : supple [de-identified] : FROM [de-identified] : grossly intact

## 2024-10-02 NOTE — HISTORY OF PRESENT ILLNESS
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 Pt reports that she developed severe pain in her LLE, however she was noted to have R soleal vein DVT and was started on Eliquis. No h/o DVT or PE in pt or family, not on OCPs, no recent surgeries. Denies CP, SOB.

## 2024-10-02 NOTE — ADDENDUM
[FreeTextEntry1] : This note was written by Nevin BLACKMAN, acting as a scribe for Dr. Adi Chau.  I, Dr. Adi Chau, have read and attest that all the information, medical decision-making, and discharge instructions within are true and accurate.  I, Dr. Adi Chau, personally performed the evaluation and management (E/M) services for this new patient.  That E/M includes conducting the initial examination, assessing all conditions, and establishing the plan of care.  Today, my ACP, Neivn BLACKMAN, was here to observe my evaluation and management services for this patient to be followed going forward.

## 2025-01-16 ENCOUNTER — APPOINTMENT (OUTPATIENT)
Dept: OTOLARYNGOLOGY | Facility: CLINIC | Age: 65
End: 2025-01-16

## 2025-03-04 ENCOUNTER — APPOINTMENT (OUTPATIENT)
Dept: NEUROLOGY | Facility: CLINIC | Age: 65
End: 2025-03-04

## 2025-04-23 ENCOUNTER — APPOINTMENT (OUTPATIENT)
Dept: OTOLARYNGOLOGY | Facility: CLINIC | Age: 65
End: 2025-04-23

## 2025-06-06 ENCOUNTER — APPOINTMENT (OUTPATIENT)
Dept: OTOLARYNGOLOGY | Facility: CLINIC | Age: 65
End: 2025-06-06

## 2025-06-06 ENCOUNTER — NON-APPOINTMENT (OUTPATIENT)
Age: 65
End: 2025-06-06

## 2025-06-06 VITALS — WEIGHT: 139 LBS | BODY MASS INDEX: 23.73 KG/M2 | HEIGHT: 64 IN

## 2025-06-06 PROCEDURE — 99213 OFFICE O/P EST LOW 20 MIN: CPT

## 2025-08-06 ENCOUNTER — APPOINTMENT (OUTPATIENT)
Dept: OTOLARYNGOLOGY | Facility: CLINIC | Age: 65
End: 2025-08-06